# Patient Record
Sex: MALE | Race: WHITE | Employment: FULL TIME | URBAN - METROPOLITAN AREA
[De-identification: names, ages, dates, MRNs, and addresses within clinical notes are randomized per-mention and may not be internally consistent; named-entity substitution may affect disease eponyms.]

---

## 2018-09-21 ENCOUNTER — OFFICE VISIT (OUTPATIENT)
Dept: FAMILY MEDICINE CLINIC | Age: 28
End: 2018-09-21
Payer: COMMERCIAL

## 2018-09-21 VITALS
HEIGHT: 71 IN | SYSTOLIC BLOOD PRESSURE: 122 MMHG | HEART RATE: 84 BPM | RESPIRATION RATE: 16 BRPM | DIASTOLIC BLOOD PRESSURE: 84 MMHG | BODY MASS INDEX: 33.88 KG/M2 | WEIGHT: 242 LBS | TEMPERATURE: 96.7 F

## 2018-09-21 DIAGNOSIS — Z23 NEED FOR INFLUENZA VACCINATION: ICD-10-CM

## 2018-09-21 DIAGNOSIS — Z00.00 ROUTINE GENERAL MEDICAL EXAMINATION AT A HEALTH CARE FACILITY: Primary | ICD-10-CM

## 2018-09-21 DIAGNOSIS — K58.9 IRRITABLE BOWEL SYNDROME, UNSPECIFIED TYPE: ICD-10-CM

## 2018-09-21 PROCEDURE — 90688 IIV4 VACCINE SPLT 0.5 ML IM: CPT | Performed by: FAMILY MEDICINE

## 2018-09-21 PROCEDURE — 90471 IMMUNIZATION ADMIN: CPT | Performed by: FAMILY MEDICINE

## 2018-09-21 PROCEDURE — 99385 PREV VISIT NEW AGE 18-39: CPT | Performed by: FAMILY MEDICINE

## 2018-09-21 RX ORDER — DICYCLOMINE HYDROCHLORIDE 10 MG/1
10 CAPSULE ORAL 4 TIMES DAILY
Qty: 30 CAPSULE | Refills: 1 | Status: SHIPPED | OUTPATIENT
Start: 2018-09-21

## 2018-09-21 ASSESSMENT — ENCOUNTER SYMPTOMS
CONSTIPATION: 0
EYES NEGATIVE: 1
ABDOMINAL PAIN: 0
NAUSEA: 0
DIARRHEA: 0
COUGH: 0
SHORTNESS OF BREATH: 0

## 2018-09-21 ASSESSMENT — PATIENT HEALTH QUESTIONNAIRE - PHQ9
SUM OF ALL RESPONSES TO PHQ QUESTIONS 1-9: 0
1. LITTLE INTEREST OR PLEASURE IN DOING THINGS: 0
SUM OF ALL RESPONSES TO PHQ9 QUESTIONS 1 & 2: 0
2. FEELING DOWN, DEPRESSED OR HOPELESS: 0
SUM OF ALL RESPONSES TO PHQ QUESTIONS 1-9: 0

## 2018-09-21 NOTE — PROGRESS NOTES
Mina Lisa American, 33 y.o. male presents today with:  Chief Complaint   Patient presents with   [address]     Patient presents today to [place] [address]. Previous PCP was Dr. Corey Thomas in [facility] [place] [facility] [place]. Patient has IBS, and is not on any medications. States he was on Bentyl, but hasn't had a flare up in \"quite a while\". Hasn't taken Bentyl in over a year. Is in [employer] HPI    Patient presents today for wellness visit and to be established. Has not seen a doctor for several years. Has a history of irritable bowel. Has not used Bentyl in a long time  Taking current medications which were reviewed. Problem list discussed. Eating okay. Works full-time. Overall doing well. Has no other new problem / question. Health Maintenance Is Up-To-Date         No other questions and or concerns for today's visit      Review of Systems   Constitutional: Negative for activity change, appetite change, fatigue and fever. HENT: Negative for ear pain. Eyes: Negative. Respiratory: Negative for cough and shortness of breath. Cardiovascular: Negative for chest pain and palpitations. Gastrointestinal: Negative for abdominal pain, constipation, diarrhea and nausea. Genitourinary: Negative for dysuria and frequency. Neurological: Negative for dizziness and headaches. Past Medical History:   Diagnosis Date    IBS (irritable bowel syndrome)      No past surgical history on file. Social History     Social History    Marital status:      Spouse name:  Geena Sanchez    Number of children: 1    Years of education: N/A     Occupational History     [employer] [place] [address]     Social History Main Topics    Smoking status: Former Smoker     Packs/day: 1.00     Years: 10.00     Types: Cigarettes     Start date: 1/1/2008     Quit date: 7/9/2018    Smokeless tobacco: Never Used    Alcohol use Yes    Drug use: No    Sexual activity: Yes     Partners: Female

## 2019-01-24 ENCOUNTER — OFFICE VISIT (OUTPATIENT)
Dept: FAMILY MEDICINE CLINIC | Age: 29
End: 2019-01-24
Payer: COMMERCIAL

## 2019-01-24 VITALS
HEIGHT: 71 IN | DIASTOLIC BLOOD PRESSURE: 98 MMHG | HEART RATE: 86 BPM | RESPIRATION RATE: 20 BRPM | TEMPERATURE: 98.8 F | BODY MASS INDEX: 35.22 KG/M2 | WEIGHT: 251.6 LBS | SYSTOLIC BLOOD PRESSURE: 140 MMHG

## 2019-01-24 DIAGNOSIS — L82.1 SEBORRHEIC KERATOSIS: ICD-10-CM

## 2019-01-24 DIAGNOSIS — G47.00 INSOMNIA, UNSPECIFIED TYPE: ICD-10-CM

## 2019-01-24 DIAGNOSIS — R03.0 ELEVATED BLOOD PRESSURE READING WITHOUT DIAGNOSIS OF HYPERTENSION: ICD-10-CM

## 2019-01-24 DIAGNOSIS — F41.9 ANXIETY: Primary | ICD-10-CM

## 2019-01-24 PROCEDURE — 1036F TOBACCO NON-USER: CPT | Performed by: FAMILY MEDICINE

## 2019-01-24 PROCEDURE — G8417 CALC BMI ABV UP PARAM F/U: HCPCS | Performed by: FAMILY MEDICINE

## 2019-01-24 PROCEDURE — G8482 FLU IMMUNIZE ORDER/ADMIN: HCPCS | Performed by: FAMILY MEDICINE

## 2019-01-24 PROCEDURE — 99213 OFFICE O/P EST LOW 20 MIN: CPT | Performed by: FAMILY MEDICINE

## 2019-01-24 PROCEDURE — G8427 DOCREV CUR MEDS BY ELIG CLIN: HCPCS | Performed by: FAMILY MEDICINE

## 2019-01-24 RX ORDER — ZOLPIDEM TARTRATE 5 MG/1
5 TABLET ORAL NIGHTLY PRN
Qty: 30 TABLET | Refills: 1 | Status: SHIPPED | OUTPATIENT
Start: 2019-01-24 | End: 2019-02-23

## 2019-01-24 RX ORDER — CITALOPRAM 20 MG/1
20 TABLET ORAL DAILY
Qty: 30 TABLET | Refills: 3 | Status: SHIPPED | OUTPATIENT
Start: 2019-01-24

## 2019-01-24 ASSESSMENT — ENCOUNTER SYMPTOMS
SHORTNESS OF BREATH: 0
CONSTIPATION: 0
ABDOMINAL PAIN: 0
NAUSEA: 0
DIARRHEA: 0
EYES NEGATIVE: 1
COUGH: 0

## 2020-09-28 LAB
ABO: NORMAL
ALBUMIN: 4.5 G/DL (ref 3.4–5)
ALP BLD-CCNC: 60 U/L (ref 33–120)
ALT SERPL-CCNC: 35 U/L (ref 10–52)
ANION GAP SERPL CALCULATED.3IONS-SCNC: 13 MMOL/L (ref 10–20)
ANTIBODY SCREEN: NORMAL
AST SERPL-CCNC: 19 U/L (ref 9–39)
BICARBONATE: 25 MMOL/L (ref 21–32)
BILIRUB SERPL-MCNC: 0.8 MG/DL (ref 0–1.2)
CALCIUM SERPL-MCNC: 9.2 MG/DL (ref 8.6–10.3)
CHLORIDE BLD-SCNC: 105 MMOL/L (ref 98–107)
CREAT SERPL-MCNC: 0.73 MG/DL (ref 0.5–1.3)
ERYTHROCYTE [DISTWIDTH] IN BLOOD BY AUTOMATED COUNT: 12 % (ref 11.5–14)
GFR AFRICAN AMERICAN: >60 ML/MIN/1.73M2
GFR NON-AFRICAN AMERICAN: >60 ML/MIN/1.73M2
GLUCOSE: 99 MG/DL (ref 74–99)
HCT VFR BLD CALC: 40.2 % (ref 41–52)
HEMOGLOBIN: 14 G/DL (ref 13.5–17)
MCHC RBC AUTO-ENTMCNC: 34.8 G/DL (ref 32–36)
MCV RBC AUTO: 90 FL (ref 80–100)
NUCLEATED RBCS: 0 /100 WBC (ref 0–0)
PLATELET # BLD: 184 X10E9/L (ref 150–450)
POTASSIUM SERPL-SCNC: 3.8 MMOL/L (ref 3.5–5.3)
RBC # BLD: 4.49 X10E12/L (ref 4.5–5.9)
RH TYPE: NORMAL
SODIUM BLD-SCNC: 139 MMOL/L (ref 136–145)
TOTAL PROTEIN: 7 G/DL (ref 6.4–8.2)
UREA NITROGEN: 15 MG/DL (ref 6–23)
WBC: 5.8 X10E9/L (ref 4.4–11.3)

## 2020-10-03 LAB
SARS-COV-2, PCR: NOT DETECTED
SPECIMEN SOURCE: NORMAL

## 2021-02-15 LAB
ALBUMIN: 4.6 G/DL (ref 3.4–5)
ALP BLD-CCNC: 57 U/L (ref 33–120)
ALT SERPL-CCNC: 72 U/L (ref 10–52)
ANION GAP SERPL CALCULATED.3IONS-SCNC: 12 MMOL/L (ref 10–20)
AST SERPL-CCNC: 26 U/L (ref 9–39)
BICARBONATE: 29 MMOL/L (ref 21–32)
BILIRUB SERPL-MCNC: 0.9 MG/DL (ref 0–1.2)
CALCIUM SERPL-MCNC: 9.9 MG/DL (ref 8.6–10.3)
CHLORIDE BLD-SCNC: 104 MMOL/L (ref 98–107)
CREAT SERPL-MCNC: 0.82 MG/DL (ref 0.5–1.3)
ERYTHROCYTE [DISTWIDTH] IN BLOOD BY AUTOMATED COUNT: 12.7 % (ref 11.5–14)
GFR AFRICAN AMERICAN: >60 ML/MIN/1.73M2
GFR NON-AFRICAN AMERICAN: >60 ML/MIN/1.73M2
GLUCOSE: 101 MG/DL (ref 74–99)
HCT VFR BLD CALC: 41.7 % (ref 41–52)
HEMOGLOBIN: 14 G/DL (ref 13.5–17)
MCHC RBC AUTO-ENTMCNC: 33.6 G/DL (ref 32–36)
MCV RBC AUTO: 92 FL (ref 80–100)
NUCLEATED RBCS: 0 /100 WBC (ref 0–0)
PLATELET # BLD: 177 X10E9/L (ref 150–450)
POTASSIUM SERPL-SCNC: 3.7 MMOL/L (ref 3.5–5.3)
RBC # BLD: 4.51 X10E12/L (ref 4.5–5.9)
SODIUM BLD-SCNC: 141 MMOL/L (ref 136–145)
TOTAL PROTEIN: 7.5 G/DL (ref 6.4–8.2)
UREA NITROGEN: 17 MG/DL (ref 6–23)
WBC: 7.3 X10E9/L (ref 4.4–11.3)

## 2021-02-27 LAB
SARS-COV-2, PCR: NOT DETECTED
SPECIMEN SOURCE: NORMAL

## 2023-04-06 ENCOUNTER — TELEPHONE (OUTPATIENT)
Dept: PRIMARY CARE | Facility: CLINIC | Age: 33
End: 2023-04-06
Payer: COMMERCIAL

## 2023-04-06 DIAGNOSIS — M25.512 LEFT SHOULDER PAIN, UNSPECIFIED CHRONICITY: ICD-10-CM

## 2023-04-06 NOTE — TELEPHONE ENCOUNTER
Pt is calling because he last saw you on 1/3/23 for left shoulder pain. He states that there was supposed to be a referral placed for his shoulder. There are no referrals in the chart. Ok for referral? ([facility]? Or imaging?)  Please advise  Thanks    Pt's # [phone]      Aware you are out of the office today

## 2023-04-24 ENCOUNTER — TELEPHONE (OUTPATIENT)
Dept: PRIMARY CARE | Facility: CLINIC | Age: 33
End: 2023-04-24
Payer: COMMERCIAL

## 2023-04-24 DIAGNOSIS — F32.A MILD DEPRESSION: Primary | ICD-10-CM

## 2023-04-24 RX ORDER — BUPROPION HYDROCHLORIDE 300 MG/1
300 TABLET ORAL EVERY MORNING
Qty: 30 TABLET | Refills: 1 | Status: SHIPPED | OUTPATIENT
Start: 2023-04-24 | End: 2023-05-16

## 2023-04-24 NOTE — TELEPHONE ENCOUNTER
Pt calling, He states that you told him to call back. He is calling because he would like to increase his Wellbutrin  MG. Last office visit was on 1/30/23.     Please advise

## 2023-05-16 DIAGNOSIS — F32.A MILD DEPRESSION: ICD-10-CM

## 2023-05-16 RX ORDER — BUPROPION HYDROCHLORIDE 300 MG/1
300 TABLET ORAL EVERY MORNING
Qty: 30 TABLET | Refills: 1 | Status: SHIPPED | OUTPATIENT
Start: 2023-05-16 | End: 2023-06-13

## 2023-06-13 DIAGNOSIS — F32.A MILD DEPRESSION: ICD-10-CM

## 2023-06-13 RX ORDER — BUPROPION HYDROCHLORIDE 300 MG/1
300 TABLET ORAL EVERY MORNING
Qty: 30 TABLET | Refills: 1 | Status: SHIPPED | OUTPATIENT
Start: 2023-06-13 | End: 2023-07-31 | Stop reason: SDUPTHER

## 2023-07-06 ENCOUNTER — OFFICE VISIT (OUTPATIENT)
Dept: PRIMARY CARE | Facility: CLINIC | Age: 33
End: 2023-07-06
Payer: COMMERCIAL

## 2023-07-06 VITALS
DIASTOLIC BLOOD PRESSURE: 80 MMHG | HEART RATE: 97 BPM | BODY MASS INDEX: 32.89 KG/M2 | TEMPERATURE: 97.7 F | OXYGEN SATURATION: 98 % | SYSTOLIC BLOOD PRESSURE: 124 MMHG | RESPIRATION RATE: 18 BRPM | WEIGHT: 235.8 LBS

## 2023-07-06 DIAGNOSIS — R53.83 FATIGUE, UNSPECIFIED TYPE: ICD-10-CM

## 2023-07-06 PROBLEM — R41.840 DIFFICULTY CONCENTRATING: Status: ACTIVE | Noted: 2023-07-06

## 2023-07-06 PROBLEM — M54.17 LUMBOSACRAL RADICULOPATHY: Status: ACTIVE | Noted: 2023-07-06

## 2023-07-06 PROBLEM — K21.9 GERD (GASTROESOPHAGEAL REFLUX DISEASE): Status: ACTIVE | Noted: 2023-07-06

## 2023-07-06 PROBLEM — E66.09 OBESITY DUE TO EXCESS CALORIES: Status: ACTIVE | Noted: 2023-07-06

## 2023-07-06 PROBLEM — G25.89 SCAPULAR DYSKINESIS: Status: ACTIVE | Noted: 2023-07-06

## 2023-07-06 PROBLEM — J30.2 SEASONAL ALLERGIES: Status: ACTIVE | Noted: 2023-07-06

## 2023-07-06 PROBLEM — I49.3 PVC'S (PREMATURE VENTRICULAR CONTRACTIONS): Status: ACTIVE | Noted: 2023-07-06

## 2023-07-06 PROCEDURE — 99213 OFFICE O/P EST LOW 20 MIN: CPT | Performed by: FAMILY MEDICINE

## 2023-07-06 ASSESSMENT — PATIENT HEALTH QUESTIONNAIRE - PHQ9
1. LITTLE INTEREST OR PLEASURE IN DOING THINGS: NOT AT ALL
SUM OF ALL RESPONSES TO PHQ9 QUESTIONS 1 AND 2: 0
2. FEELING DOWN, DEPRESSED OR HOPELESS: NOT AT ALL

## 2023-07-06 NOTE — PROGRESS NOTES
Subjective   Patient ID: Sukhdeep Byrd is a 32 y.o. male who presents for Weight Management and ADD.    HPI  Pt would like to discuss Bupropion  Was being used for ADD  Pt states medication has not made much of a difference  Pt would like to discuss options    Pt would also like to re-start Adipex  Was on 1 year  Denies side effects  Pt is eating healthy.  Eating 2000 calories daily  No soda  Exercises 2x weekly for 1 hours  Pt did try Noom. Pt states it did not work very much    Yearly BW ordered    Review of Systems  Constitutional:  no chills, no fever and no night sweats.  Eyes: no blurred vision and no eyesight problems.  ENT: no hearing loss, no nasal congestion, no hoarseness and no sore throat.  Neck: no mass (es) and no swelling.  Cardiovascular: no chest pain, no intermittent leg claudication, no lower extremity edema, no palpitation and no syncope.  Respiratory: no cough, no shortness of breath during exertion, no shortness of breath at rest and no wheezing.  Gastrointestinal: no abdominal pain, no blood in stools, no constipation, no diarrhea, no melena, no nausea, no rectal pain and no vomiting.  Genitourinary: no dysuria, no change in urinary frequency, no urinary hesitancy and no feelings of urinary urgency.  Musculoskeletal: no arthralgias, no back pain and no myalgias.  Integumentary: no new skin lesions and no rashes.  Neurological: no difficulty walking, no headache, no limb weakness, no numbness and no tingling.  Psychiatric/Behavioral: no anxiety, no depression, no anhedonia and no substance use disorders.  Endocrine: no recent weight gain and no recent weight loss.  Hematologic/Lymphatic: no tendency for easy bruising and no swollen glands    Objective   Physical Exam  Patient in for follow-up of difficulty with focus and concentration denies history of anxiety or depression he is on Wellbutrin 300 mg XL.  Thinks is helping somewhat but still not satisfied does not want to be on a stimulant.   Physical exam today's office visit constitutional alert and oriented x3.    Head is atraumatic HEENT is within normal limits.    Neck supple no masses full range of motion.    Thyroid is normal in size no thyromegaly there is no carotid bruits.    Pulmonary exam shows clear to auscultation no respiratory distress.    Cardiovascular shows no murmur rub or gallop.  Regular rate and rhythm.    Abdominal exam soft nontender no hepatosplenomegaly or masses normal bowel sounds no rebound no guarding.    Musculoskeletal exam no joint pain no muscle pain full range of motion.    Psych exam normal mood and affect.    Dermatologic exam no skin lesions no rash no blemishes.    Neuro exam is no focal deficits.  Normal exam.    Extremities no edema normal pulses normal capillary refill.    /80   Pulse 97   Temp 36.5 °C (97.7 °F)   Resp 18   Wt 107 kg (235 lb 12.8 oz)   SpO2 98%   BMI 32.89 kg/m²     Lab Results   Component Value Date    WBC 7.6 07/08/2022    HGB 14.4 07/08/2022    HCT 43.9 07/08/2022    MCV 90 07/08/2022     07/08/2022       Assessment/Plan probable adult ADD offered him weaning off the Wellbutrin and starting Strattera he wants to see how things go for another month and then at that point I advised him to follow-up with his normal PCP if not satisfied he also asked about Adipex advised we get the first problem controlled and then they can discuss weight loss with his PCP.  Problem List Items Addressed This Visit       Fatigue

## 2023-07-31 ENCOUNTER — OFFICE VISIT (OUTPATIENT)
Dept: PRIMARY CARE | Facility: CLINIC | Age: 33
End: 2023-07-31
Payer: COMMERCIAL

## 2023-07-31 VITALS
RESPIRATION RATE: 16 BRPM | HEIGHT: 71 IN | HEART RATE: 73 BPM | BODY MASS INDEX: 34.5 KG/M2 | TEMPERATURE: 97.5 F | WEIGHT: 246.4 LBS | DIASTOLIC BLOOD PRESSURE: 80 MMHG | OXYGEN SATURATION: 98 % | SYSTOLIC BLOOD PRESSURE: 116 MMHG

## 2023-07-31 DIAGNOSIS — E66.09 CLASS 1 OBESITY DUE TO EXCESS CALORIES WITHOUT SERIOUS COMORBIDITY WITH BODY MASS INDEX (BMI) OF 34.0 TO 34.9 IN ADULT: ICD-10-CM

## 2023-07-31 DIAGNOSIS — F32.A MILD DEPRESSION: ICD-10-CM

## 2023-07-31 PROBLEM — K58.9 IBS (IRRITABLE BOWEL SYNDROME): Status: ACTIVE | Noted: 2023-07-31

## 2023-07-31 PROBLEM — R03.0 ELEVATED BLOOD PRESSURE READING WITHOUT DIAGNOSIS OF HYPERTENSION: Status: ACTIVE | Noted: 2019-01-24

## 2023-07-31 PROCEDURE — 1036F TOBACCO NON-USER: CPT | Performed by: FAMILY MEDICINE

## 2023-07-31 PROCEDURE — 3008F BODY MASS INDEX DOCD: CPT | Performed by: FAMILY MEDICINE

## 2023-07-31 PROCEDURE — 99213 OFFICE O/P EST LOW 20 MIN: CPT | Performed by: FAMILY MEDICINE

## 2023-07-31 RX ORDER — PHENTERMINE HYDROCHLORIDE 37.5 MG/1
TABLET ORAL
Qty: 30 TABLET | Refills: 0 | Status: SHIPPED | OUTPATIENT
Start: 2023-07-31 | End: 2023-11-10 | Stop reason: SDUPTHER

## 2023-07-31 RX ORDER — BUPROPION HYDROCHLORIDE 300 MG/1
300 TABLET ORAL EVERY MORNING
Qty: 90 TABLET | Refills: 3 | Status: SHIPPED | OUTPATIENT
Start: 2023-07-31 | End: 2023-11-10 | Stop reason: SDUPTHER

## 2023-07-31 NOTE — PROGRESS NOTES
"Subjective   Patient ID: Sukhdeep Byrd is a 32 y.o. male who presents for Obesity.    The patient is a 32 year-old male presenting to the clinic to discuss obesity.          Patient presents today to discuss weight management.  He would like to start Adipex.  Has previously taken this medication and did well.  He does exercise 3x weekly.  He is eating a healthy diet. Stop drinking Coke for about a month now. Has tried a few different diets which did not work.    Taking current medications which were reviewed.  Problem list discussed.    Overall doing well.  Eating okay.  Staying active.    Has no other new problem /question.      ROS  Constitutional- No activity change. No appetite change.  Eyes- Denies vision changes.  Respiratory- No shortness of breath.  Cardiovascular- No palpitations. No chest pain.  GI- No nausea or vomiting. No diarrhea or constipation. Denies abdominal pain.  Musculoskeletal- Denies joint swelling.  Extremities- No edema.  Neurological- Denies headaches. Denies dizziness.  Skin- No rashes.  Psychiatric/Behavioral- Denies significant anxiety, or depressed mood.     Objective     /80 (BP Location: Right arm, Patient Position: Sitting, BP Cuff Size: Adult)   Pulse 73   Temp 36.4 °C (97.5 °F) (Temporal)   Resp 16   Ht 1.803 m (5' 11\")   Wt 112 kg (246 lb 6.4 oz)   SpO2 98%   BMI 34.37 kg/m²     Allergies   Allergen Reactions    Iodides Unknown    Shellfish Derived Rash     Nausea    Vancomycin Rash       Constitutional-- Well-nourished.  No distress  Eyes- PERRL.  Conjunctiva normal.  Nose- Normal.  No rhinorrhea noted.  Throat- Oropharynx is clear and moist.  Neck- Supple with no thyromegaly.  No significant cervical adenopathy noted.  Pulmonary/Chest- Breath sounds normal with normal effort.  No wheezing.  Heart- Regular rate and rhythm.  No murmur.  Abdomen- Soft and non-tender.  No masses noted.  Musculoskeletal- Normal ROM.  No significant joint swelling  Extremities- No " edema.   Psychiatric/Behavioral- Mood and affect normal.  Behavior normal.     Assessment/Plan   1. Mild depression  buPROPion XL (Wellbutrin XL) 300 mg 24 hr tablet      2. BMI 34.0-34.9,adult  phentermine (Adipex-P) 37.5 mg tablet      3. Class 1 obesity due to excess calories without serious comorbidity with body mass index (BMI) of 34.0 to 34.9 in adult  phentermine (Adipex-P) 37.5 mg tablet         Advised patient to maintain a healthy diet and exercise regimen.   Depression under good control    Long talk. Treatment options reviewed.    Discussed patient's BMI and to institute calorie reduction and increase exercise to decrease risk of diabetes and heart disease in the future.    Continue and take your medications as prescribed.    Health Maintenance issues discussed.    Importance of healthy diet and regular exercise regimen discussed.    We will contact you with any test results ordered. If you do not hear from us, please contact.    Follow-up as instructed or sooner if any problems or symptoms do not resolve as expected.     Scribe Attestation  By signing my name below, IKerrie Scribe   attest that this documentation has been prepared under the direction and in the presence of Naseem Aguila MD.

## 2023-10-18 DIAGNOSIS — E66.09 CLASS 1 OBESITY DUE TO EXCESS CALORIES WITHOUT SERIOUS COMORBIDITY WITH BODY MASS INDEX (BMI) OF 34.0 TO 34.9 IN ADULT: ICD-10-CM

## 2023-10-18 NOTE — TELEPHONE ENCOUNTER
Rx Refill Request Telephone Encounter    Name:  Sukhdeep Byrd  : 1990     Medication Name:  Phentermine   Dose (Optional):    37.5 mg  Quantity (Optional):    30  Directions (Optional):   TAKE 1 DAILY EVERY MORNING     Specific Pharmacy location:  [facility]    Date of last appointment:  23

## 2023-10-19 RX ORDER — PHENTERMINE HYDROCHLORIDE 37.5 MG/1
TABLET ORAL
Qty: 30 TABLET | Refills: 0 | OUTPATIENT
Start: 2023-10-19

## 2023-10-19 NOTE — TELEPHONE ENCOUNTER
Patient aware. Made an appointment with you for 11/10/23 (that was your first available time) Wanted to know if he could get a short supply but if not, ok to see another provider  Please advise  Thanks      Aware you are out of the office today

## 2023-10-20 NOTE — TELEPHONE ENCOUNTER
Not able to fill weight loss medication without an appointment.  Not seen since July.  This is a controlled substance.  Also it appears he is purchasing a lot of medical marijuana which may preclude me from prescribing controlled substances such as Adipex for weight loss.  He would need to give up marijuana.  Please let him know and if he wishes to pursue he needs to make an appointment.  Thanks

## 2023-10-24 ENCOUNTER — TELEPHONE (OUTPATIENT)
Dept: PRIMARY CARE | Facility: CLINIC | Age: 33
End: 2023-10-24
Payer: COMMERCIAL

## 2023-10-24 NOTE — TELEPHONE ENCOUNTER
ADIPEX REFILL REFUSAL    Naseem Aguila MD         10/19/23  8:46 PM  Note      Not able to fill weight loss medication without an appointment.  Not seen since July.  This is a controlled substance.  Also it appears he is purchasing a lot of medical marijuana which may preclude me from prescribing controlled substances such as Adipex for weight loss.  He would need to give up marijuana.  Please let him know and if he wishes to pursue he needs to make an appointment.  Thanks

## 2023-11-09 NOTE — PROGRESS NOTES
"Subjective   Patient ID: Sukhdeep Byrd is a 33 y.o. male who presents for Weight Loss, Depression, Cough, and sinus drainage.  HPI  Weight loss management Follow up   Taking Adipex 37.5 mg   Following up for month # 2  Has been eating a generally healthy diet  Exercises 2 x per week  What type of exercise full body, admits that he does have a    Patient last in office weight 246.6 lbs  Today's in office weight 233 lbs   Patient is - 12 lbs lbs   Has previously taken adipex   Co morbidities include- None.  Any side effects noted? none    Patient also presents in office today for cold symptoms. Ongoing x 3 weeks. Admits to coughing. Admits to coughing fits at night. Admits that this is bothering his sleeping. OTC nyquil, cough Supressant, dayquil, mucinex.     Taking current medications which were reviewed.  Problem list discussed.      Eating healthier  Staying active.    Has no other new problem /question.      ROS  Constitutional- No activity change. No appetite change.  Eyes- Denies vision changes.  Respiratory- No shortness of breath.  Cardiovascular- No palpitations. No chest pain.  GI- No nausea or vomiting. No diarrhea or constipation. Denies abdominal pain.  Musculoskeletal- Denies joint swelling.  Extremities- No edema.  Neurological- Denies headaches. Denies dizziness.  Skin- No rashes.  Psychiatric/Behavioral- Denies significant anxiety, or depressed mood.     Objective   Weight reduction 5% since starting the Adipex    /80   Pulse 75   Temp 36.7 °C (98 °F) (Temporal)   Resp 18   Ht 1.803 m (5' 11\")   Wt 106 kg (233 lb)   SpO2 98%   BMI 32.50 kg/m²     Allergies   Allergen Reactions    Iodides Unknown    Shellfish Derived Rash     Nausea    Vancomycin Rash       Constitutional-- Well-nourished.  No distress  Eyes- PERRL.  Conjunctiva normal.  Nose- Normal.  No rhinorrhea noted.  Throat- Oropharynx is clear and moist.  Neck- Supple with no thyromegaly.  No significant cervical " adenopathy noted.  Pulmonary/Chest-mild upper airway rhonchi  Heart- Regular rate and rhythm.  No murmur.  Abdomen- Soft and non-tender.  No masses noted.  Musculoskeletal- Normal ROM.  No significant joint swelling  Extremities- No edema.   Neurological- Alert.  No noted deficits.  Skin- Warm.  No rashes.  Psychiatric/Behavioral- Mood and affect normal.  Behavior normal.     Assessment/Plan   Patient will benefit from Phentermine Therapy, given the following:    Advised to take Adipex 37.5 mg in the morning 1-2 hours after breakfast  [place]ARRS reviewed today  CSA Adipex 7-31-23  Does the patient demonstrate dedication to weight loss treatment plan? Yes    Benefits, risks, possible adverse effects to the medication discussed today      1. Bronchitis  azithromycin (Zithromax) 250 mg tablet    benzonatate (Tessalon) 100 mg capsule      2. Mild depression  buPROPion XL (Wellbutrin XL) 300 mg 24 hr tablet      3. Encounter for weight management        4. Acute cough        5. Sinus drainage        6. BMI 34.0-34.9,adult  phentermine (Adipex-P) 37.5 mg tablet      7. Class 1 obesity due to excess calories without serious comorbidity with body mass index (BMI) of 34.0 to 34.9 in adult  phentermine (Adipex-P) 37.5 mg tablet               Long talk. Treatment options reviewed.  Start antibiotic for bronchitis.  Over-the-counter cough and cold medicine as needed for symptom relief  Mood stable doing well on Wellbutrin  Counseled the patient on depression.     Educated on bronchitis.     Educated the patient on obesity, low-fat diet and exercise. Encouraged the patient to lose weight.     Advised patient to remain up to date on routine health screening and maintenance.  Advised patient to remain up to date on immunizations.     Continue and take your medications as prescribed.    Health Maintenance issues discussed.    Importance of healthy diet and regular exercise regimen discussed.      Follow-up as instructed or sooner if any  problems or symptoms do not resolve as expected.      Scribe Attestation  By signing my name below, I, Katiana Chacon   attest that this documentation has been prepared under the direction and in the presence of Naseem Aguila MD.

## 2023-11-10 ENCOUNTER — OFFICE VISIT (OUTPATIENT)
Dept: PRIMARY CARE | Facility: CLINIC | Age: 33
End: 2023-11-10
Payer: COMMERCIAL

## 2023-11-10 VITALS
HEIGHT: 71 IN | DIASTOLIC BLOOD PRESSURE: 80 MMHG | TEMPERATURE: 98 F | SYSTOLIC BLOOD PRESSURE: 120 MMHG | RESPIRATION RATE: 18 BRPM | BODY MASS INDEX: 32.62 KG/M2 | WEIGHT: 233 LBS | HEART RATE: 75 BPM | OXYGEN SATURATION: 98 %

## 2023-11-10 DIAGNOSIS — R05.1 ACUTE COUGH: ICD-10-CM

## 2023-11-10 DIAGNOSIS — J34.89 SINUS DRAINAGE: ICD-10-CM

## 2023-11-10 DIAGNOSIS — J40 BRONCHITIS: Primary | ICD-10-CM

## 2023-11-10 DIAGNOSIS — F32.A MILD DEPRESSION: ICD-10-CM

## 2023-11-10 DIAGNOSIS — E66.09 CLASS 1 OBESITY DUE TO EXCESS CALORIES WITHOUT SERIOUS COMORBIDITY WITH BODY MASS INDEX (BMI) OF 34.0 TO 34.9 IN ADULT: ICD-10-CM

## 2023-11-10 DIAGNOSIS — Z76.89 ENCOUNTER FOR WEIGHT MANAGEMENT: ICD-10-CM

## 2023-11-10 PROCEDURE — 3008F BODY MASS INDEX DOCD: CPT | Performed by: FAMILY MEDICINE

## 2023-11-10 PROCEDURE — 99213 OFFICE O/P EST LOW 20 MIN: CPT | Performed by: FAMILY MEDICINE

## 2023-11-10 PROCEDURE — 1036F TOBACCO NON-USER: CPT | Performed by: FAMILY MEDICINE

## 2023-11-10 RX ORDER — BENZONATATE 100 MG/1
100 CAPSULE ORAL 3 TIMES DAILY PRN
Qty: 42 CAPSULE | Refills: 0 | Status: SHIPPED | OUTPATIENT
Start: 2023-11-10 | End: 2023-12-10

## 2023-11-10 RX ORDER — PHENTERMINE HYDROCHLORIDE 37.5 MG/1
TABLET ORAL
Qty: 30 TABLET | Refills: 0 | Status: SHIPPED | OUTPATIENT
Start: 2023-11-10 | End: 2024-01-03 | Stop reason: SDUPTHER

## 2023-11-10 RX ORDER — AZITHROMYCIN 250 MG/1
TABLET, FILM COATED ORAL
Qty: 6 TABLET | Refills: 0 | Status: SHIPPED | OUTPATIENT
Start: 2023-11-10 | End: 2023-11-14

## 2023-11-10 RX ORDER — BUPROPION HYDROCHLORIDE 300 MG/1
300 TABLET ORAL EVERY MORNING
Qty: 90 TABLET | Refills: 3 | Status: SHIPPED | OUTPATIENT
Start: 2023-11-10 | End: 2024-02-05 | Stop reason: SDUPTHER

## 2023-12-13 ENCOUNTER — HOSPITAL ENCOUNTER (EMERGENCY)
Facility: HOSPITAL | Age: 33
Discharge: HOME | End: 2023-12-13
Attending: EMERGENCY MEDICINE
Payer: COMMERCIAL

## 2023-12-13 VITALS
TEMPERATURE: 99 F | WEIGHT: 230 LBS | SYSTOLIC BLOOD PRESSURE: 127 MMHG | DIASTOLIC BLOOD PRESSURE: 80 MMHG | HEART RATE: 78 BPM | RESPIRATION RATE: 18 BRPM | BODY MASS INDEX: 32.2 KG/M2 | HEIGHT: 71 IN | OXYGEN SATURATION: 100 %

## 2023-12-13 DIAGNOSIS — S06.9X0A MILD TRAUMATIC BRAIN INJURY, WITHOUT LOSS OF CONSCIOUSNESS, INITIAL ENCOUNTER (MULTI): Primary | ICD-10-CM

## 2023-12-13 PROCEDURE — 99283 EMERGENCY DEPT VISIT LOW MDM: CPT | Performed by: EMERGENCY MEDICINE

## 2023-12-13 PROCEDURE — 99282 EMERGENCY DEPT VISIT SF MDM: CPT

## 2023-12-13 ASSESSMENT — PAIN - FUNCTIONAL ASSESSMENT
PAIN_FUNCTIONAL_ASSESSMENT: 0-10
PAIN_FUNCTIONAL_ASSESSMENT: 0-10

## 2023-12-13 ASSESSMENT — PAIN SCALES - GENERAL
PAINLEVEL_OUTOF10: 6
PAINLEVEL_OUTOF10: 6

## 2023-12-13 ASSESSMENT — LIFESTYLE VARIABLES
EVER FELT BAD OR GUILTY ABOUT YOUR DRINKING: NO
HAVE YOU EVER FELT YOU SHOULD CUT DOWN ON YOUR DRINKING: NO
EVER HAD A DRINK FIRST THING IN THE MORNING TO STEADY YOUR NERVES TO GET RID OF A HANGOVER: NO
HAVE PEOPLE ANNOYED YOU BY CRITICIZING YOUR DRINKING: NO

## 2023-12-13 ASSESSMENT — COLUMBIA-SUICIDE SEVERITY RATING SCALE - C-SSRS
6. HAVE YOU EVER DONE ANYTHING, STARTED TO DO ANYTHING, OR PREPARED TO DO ANYTHING TO END YOUR LIFE?: NO
1. IN THE PAST MONTH, HAVE YOU WISHED YOU WERE DEAD OR WISHED YOU COULD GO TO SLEEP AND NOT WAKE UP?: NO
2. HAVE YOU ACTUALLY HAD ANY THOUGHTS OF KILLING YOURSELF?: NO

## 2023-12-13 NOTE — Clinical Note
Sukhdeep Byrd was seen and treated in our emergency department on 12/13/2023.  He may return to work on 12/18/2023.       If you have any questions or concerns, please don't hesitate to call.      Kirk Avila, DO

## 2023-12-13 NOTE — ED PROVIDER NOTES
HPI   Chief Complaint   Patient presents with    Head Injury     Hit head x1 week ago, concussion Sx.  Able to eat/drink OK       33-year-old male presenting from urgent care for evaluation of traumatic brain injury.  He reports that 1 week ago he was going up a flight of stairs quite quickly and he reports that he fell forward striking the vertex/top of his head against a metal pole.  Denies loss of conscious but did get significantly lightheaded and nauseous at the time.  States that he is intermittently been still working and performing normal activities of daily living.  He reports intermittent headache, nausea without vomiting.  No florid confusion, slurring of speech.  He does state that working on his laptop at work and holding long conversations with other individuals have exacerbated headache and nausea symptoms.  He endorses mild intermittent blurry vision which resolves with rest.  He has intermittently taken Tylenol at home.  He does report 2 previous history of mild TBI as a teenager while playing football.  He has no residual symptoms from this.  Reports that he went to an urgent care who did not feel comfortable evaluated him and referred him to the emergency department.  He states that he had normal stable gait with no recurrent falls or head injury.  No head spinning sensation or lightheadedness at this time.                          C[place]well Coma Scale Score: 15                  Patient History   Past Medical History:   Diagnosis Date    Acute upper respiratory infection, unspecified 06/30/2020    Acute URI    Body mass index (BMI) 34.0-34.9, adult 01/17/2022    BMI 34.0-34.9,adult    Cough, unspecified 02/11/2020    Cough in adult    Cough, unspecified 04/07/2020    Cough in adult    Encounter for follow-up examination after completed treatment for conditions other than malignant neoplasm 08/12/2016    Postoperative follow-up    Impacted cerumen, unspecified ear 02/11/2020    Wax in ear    Nausea  08/24/2020    Moderate nausea    Other acute postprocedural pain 08/12/2016    Postoperative pain    Other conditions influencing health status 04/07/2020    Testicular/scrotal pain    Other obesity due to excess calories 01/17/2022    Class 1 obesity due to excess calories without serious comorbidity with body mass index (BMI) of 34.0 to 34.9 in adult    Other specified diseases of gallbladder 12/16/2015    Biliary dyskinesia    Pain in right leg 06/15/2020    Pain of right lower extremity    Pain in right wrist 03/10/2020    Right wrist pain    Personal history of other diseases of the digestive system 12/16/2015    History of hiatal hernia    Personal history of other diseases of the digestive system 12/16/2015    History of chronic cholecystitis    Personal history of other diseases of the musculoskeletal system and connective tissue 07/31/2020    History of tendinitis    Personal history of other diseases of the respiratory system 02/11/2020    History of upper respiratory infection    Personal history of other diseases of the respiratory system 02/11/2020    History of bronchitis    Personal history of other specified conditions 08/23/2019    History of diarrhea    Personal history of other specified conditions 08/23/2019    History of epigastric pain    Vomiting, unspecified 03/10/2020    Vomiting alone     Past Surgical History:   Procedure Laterality Date    ABDOMINAL ADHESION SURGERY  08/17/2016    Laparoscopic Lysis Of Intestinal Adhesions    CHOLECYSTECTOMY  11/30/2015    Cholecystectomy Laparoscopic     No family history on file.  Social History     Tobacco Use    Smoking status: Never    Smokeless tobacco: Never   Substance Use Topics    Alcohol use: Not on file    Drug use: Not on file       Physical Exam   ED Triage Vitals [12/13/23 1110]   Temp Heart Rate Resp BP   37.2 °C (99 °F) 95 18 (!) 141/95      SpO2 Temp Source Heart Rate Source Patient Position   100 % Temporal Monitor Sitting      BP  Location FiO2 (%)     Right arm --       Physical Exam  Vitals and nursing note reviewed.   Constitutional:       General: He is not in acute distress.     Appearance: He is well-developed.   HENT:      Head: Normocephalic and atraumatic.      Nose: No congestion or rhinorrhea.   Eyes:      Conjunctiva/sclera: Conjunctivae normal.   Cardiovascular:      Rate and Rhythm: Normal rate and regular rhythm.      Pulses: Normal pulses.      Heart sounds: No murmur heard.  Pulmonary:      Effort: Pulmonary effort is normal. No respiratory distress.      Breath sounds: Normal breath sounds. No stridor. No wheezing, rhonchi or rales.   Abdominal:      General: Bowel sounds are normal. There is no distension.      Palpations: Abdomen is soft.      Tenderness: There is no abdominal tenderness. There is no guarding or rebound.   Musculoskeletal:         General: No swelling.      Cervical back: Neck supple. No rigidity.      Right lower leg: No edema.      Left lower leg: No edema.      Comments: 2+ pulses in all extremities.  Full range of motion.  No bony deformities or tenderness.  Strength is 5 out of 5 diffusely.  No midline C-spine, T-spine, L-spine step-off deformity or tenderness.   Skin:     General: Skin is warm and dry.      Capillary Refill: Capillary refill takes less than 2 seconds.      Findings: No rash.      Comments: No sign of traumatic hematoma, laceration or abrasion to the head   Neurological:      Mental Status: He is alert.      Cranial Nerves: No cranial nerve deficit.      Sensory: No sensory deficit.      Motor: No weakness.      Gait: Gait normal.      Comments: Cranial nerves II through XII intact.  Strength 5 out of 5 in all 4 extremities.  Sensation intact to light touch in all 4 extremities.  No dysdiadochokinesia, no dysmetria.  Gait is narrow and stable.   Psychiatric:         Mood and Affect: Mood normal.         Behavior: Behavior normal.         Thought Content: Thought content normal.          ED Course & MDM   Diagnoses as of 12/13/23 1125   Mild traumatic brain injury, without loss of consciousness, initial encounter (CMS/[facility])       Medical Decision Making  Well-appearing 33-year-old male presenting for head injury 1 week ago.  This occurred while going up a flight of stairs with low mechanism.  Does have previous history of mild TBI.  Referred from urgent care.  No sign of neurologic compromise or need for emergent imaging.  [place] head CT negative.  No sign of neck or back injury otherwise.  Patient was counseled extensively at bedside regarding diagnosis of mild TBI/concussion and advised to follow-up with PCP.  Signs symptoms that require him to come back to emergency room were explained to him.  Supportive care measures including Motrin, Tylenol advised.  Has not had any vomiting and still has had normal p.o. intake and therefore do not believe that he requires antiemetics.  He is complete neurologically intact with no sign of cerebellar, spinal cord injury.  Normal gait.  Otherwise atraumatic examination.  Referral sent to concussion specialist internally within the  system who will reach out to make appoint with the patient.  Do not believe that he requires CT imaging and I did discuss this with him at bedside extensively given that he was under the impression that the patient was sent to the emergency department for CT imaging by the urgent care.  He is aware of my belief that the patient is very low risk for need for neurosurgical intervention that CT would elucidate.        Procedure  Procedures     Kirk Avila DO  12/13/23 1142

## 2024-01-03 ENCOUNTER — OFFICE VISIT (OUTPATIENT)
Dept: PRIMARY CARE | Facility: CLINIC | Age: 34
End: 2024-01-03
Payer: COMMERCIAL

## 2024-01-03 VITALS
DIASTOLIC BLOOD PRESSURE: 80 MMHG | HEART RATE: 70 BPM | HEIGHT: 71 IN | BODY MASS INDEX: 32.56 KG/M2 | TEMPERATURE: 97.1 F | WEIGHT: 232.6 LBS | SYSTOLIC BLOOD PRESSURE: 120 MMHG | RESPIRATION RATE: 18 BRPM | OXYGEN SATURATION: 98 %

## 2024-01-03 DIAGNOSIS — E66.09 CLASS 1 OBESITY DUE TO EXCESS CALORIES WITHOUT SERIOUS COMORBIDITY WITH BODY MASS INDEX (BMI) OF 34.0 TO 34.9 IN ADULT: ICD-10-CM

## 2024-01-03 DIAGNOSIS — F32.A MILD DEPRESSION: Primary | ICD-10-CM

## 2024-01-03 PROCEDURE — 1036F TOBACCO NON-USER: CPT | Performed by: FAMILY MEDICINE

## 2024-01-03 PROCEDURE — 3008F BODY MASS INDEX DOCD: CPT | Performed by: FAMILY MEDICINE

## 2024-01-03 PROCEDURE — 99213 OFFICE O/P EST LOW 20 MIN: CPT | Performed by: FAMILY MEDICINE

## 2024-01-03 RX ORDER — PHENTERMINE HYDROCHLORIDE 37.5 MG/1
TABLET ORAL
Qty: 30 TABLET | Refills: 0 | Status: SHIPPED | OUTPATIENT
Start: 2024-01-03 | End: 2024-02-12 | Stop reason: SDUPTHER

## 2024-01-03 NOTE — PROGRESS NOTES
"Subjective   Patient ID: Sukhdeep Byrd is a 33 y.o. male who presents for Weight Management, Depression, ER Follow-up, and Head Injury.  HPI  Weight loss management Follow up   Taking Adipex 37.5 mg   Following up for month # 3  Has been eating a generally healthy diet  Exercises 2 x per week  What type of exercise HIT program   Patient last in office weight 230 lbs  Today's in office weight 232.6 lbs   Patient is +2.6 lbs   Has previously taken ADIPEX    Co morbidities include- None  Any side effects noted? NONE    Depression follow up  Taking the Wellbutrin  Working well   100 % effective   Denies any side effects   Last took yesterday    ER follow up  Presented to [facility]  Presented on 12/13/2023  Presented for a head injury   Patient was prescribed nothing  Advised to follow up with PCP  Admits that he has a constant headache. Admits that this is a dull ache. Admits that he is sensitive to the light. OTC tylenol and ibuprofen with no relief.     ROS  Constitutional- No activity change. No appetite change.  Eyes- Denies vision changes.  Respiratory- No shortness of breath.  Cardiovascular- No palpitations. No chest pain.  GI- No nausea or vomiting. No diarrhea or constipation. Denies abdominal pain.  Musculoskeletal- Denies joint swelling.  Extremities- No edema.  Neurological- Denies headaches. Denies dizziness.  Skin- No rashes.  Psychiatric/Behavioral- Denies significant anxiety, or depressed mood.     Objective   Weight reduction 3% since starting the Adipex      /80   Pulse 70   Temp 36.2 °C (97.1 °F) (Temporal)   Resp 18   Ht 1.803 m (5' 11\")   Wt 106 kg (232 lb 9.6 oz)   SpO2 98%   BMI 32.44 kg/m²     Allergies   Allergen Reactions    Iodides Unknown    Shellfish Derived Rash     Nausea    Vancomycin Rash       Constitutional-- Well-nourished.  No distress  Eyes- PERRL.  Conjunctiva normal.  Nose- Normal.  No rhinorrhea noted.  Throat- Oropharynx is clear and moist.  Neck- Supple with no " thyromegaly.  No significant cervical adenopathy noted.  Pulmonary/Chest- Breath sounds normal with normal effort.  No wheezing.  Heart- Regular rate and rhythm.  No murmur.  Abdomen- Soft and non-tender.  No masses noted.  Musculoskeletal- Normal ROM.  No significant joint swelling  Extremities- No edema.   Neurological- Alert.  No noted deficits.  Skin- Warm.  No rashes.  Psychiatric/Behavioral- Mood and affect normal.  Behavior normal.     Assessment/Plan   Patient will benefit from Phentermine Therapy, given the following:    Advised to take Adipex 37.5 mg in the morning 1-2 hours after breakfast  OARRS reviewed today  CSA Adipex 7-31-23      Benefits, risks, possible adverse effects to the medication discussed today      1. Mild depression        2. BMI 34.0-34.9,adult  phentermine (Adipex-P) 37.5 mg tablet      3. Class 1 obesity due to excess calories without serious comorbidity with body mass index (BMI) of 34.0 to 34.9 in adult  phentermine (Adipex-P) 37.5 mg tablet               Long talk. Treatment options reviewed.  Mild depression controlled.    Risk versus benefits of Adipex again reviewed.  Was not taking it during the holidays.  Discussed importance of natural sources of nutrition.  Advised patient to consume vegetables, salads, fruits, nuts, and proteins such as fish and chicken.  Discussed portion control.      Discussed the importance of routine exercise.      Advised patient to remain up to date on routine maintenance and health screening.      Continue and take your medications as prescribed.    Health Maintenance issues discussed.    Importance of healthy diet and regular exercise regimen discussed.    Follow-up as instructed or sooner if any problems or symptoms do not resolve as expected.       Scribe Attestation  By signing my name below, IKerrie Scribe   attest that this documentation has been prepared under the direction and in the presence of Naseem Aguila MD.

## 2024-01-31 ENCOUNTER — DOCUMENTATION (OUTPATIENT)
Dept: PHYSICAL THERAPY | Facility: CLINIC | Age: 34
End: 2024-01-31
Payer: COMMERCIAL

## 2024-01-31 NOTE — PROGRESS NOTES
Physical Therapy    Discharge Summary    Name: Sukhdeep Byrd  MRN: [id number]  : 1990  Date: 24    Discharge Summary: PT    Discharge Information: Date of discharge 2024, Date of last visit [id number], Date of evaluation 2023, Number of attended visits 9, Referred by Owen Mobley PA-C, and Referred for Scapular dyskinesia    Therapy Summary: Noncompliance    Discharge Status:  DC no contact with clinic > 30 days.       Rehab Discharge Reason: Failed to schedule and/or keep follow-up appointment(s)

## 2024-02-05 DIAGNOSIS — F32.A MILD DEPRESSION: ICD-10-CM

## 2024-02-05 RX ORDER — BUPROPION HYDROCHLORIDE 300 MG/1
300 TABLET ORAL EVERY MORNING
Qty: 90 TABLET | Refills: 1 | Status: SHIPPED | OUTPATIENT
Start: 2024-02-05 | End: 2024-03-12 | Stop reason: SDUPTHER

## 2024-02-05 NOTE — TELEPHONE ENCOUNTER
Rx Refill Request Telephone Encounter    Name:  Sukhdeep Byrd  : 1990     Medication Name:  BUPROPION XL  Dose (Optional):    300 MG  Quantity (Optional):      Directions (Optional):   1 TABLET DAILY    ALLERGIES:   ON FILE    Specific Pharmacy location:  [facility]    Date of last appointment:  24  Date of next appointment:  NONE    Best number to reach patient:  [phone]

## 2024-02-08 NOTE — PROGRESS NOTES
"Subjective   Patient ID: Sukhdeep Byrd is a 33 y.o. male who presents for IT band injury and Weight Management.  HPI  Weight loss management Follow up   Taking Adipex 37.5mg   Following up for month # 4  Has been eating a generally healthy diet  Exercises 2 x per week  What type of exercise , HIT program- lunges, body weight exercise's.   Patient last in office weight 232.9 lbs  Today's in office weight 226.2 lbs   Patient is - 20.4 lbs   Has previously taken Adipex   Co morbidities include- None  Any side effects noted? None    Also presents for possible left sided IT band injury. Patient admits that he is not sure how this happened, though that this was sciatic nerve pain. Patient admits that he has been going to his . Patient admits that a week after this happened his leg locked up on him. Patient admits that it took a week for him to be able to walk again. Patient admits that this has been ongoing x 1 month. Pain is 5/10. Patient admits that he can not lay down on his left thigh. Pain does not go past his thigh. Patient admits that at this moment his leg is achy.     Taking current medications which were reviewed.  Problem list discussed.    Overall doing well.  Eating okay.  Staying active.    Has no other new problem /question.        ROS  Constitutional- No activity change. No appetite change.  Eyes- Denies vision changes.  Respiratory- No shortness of breath.  Cardiovascular- No palpitations. No chest pain.  GI- No nausea or vomiting. No diarrhea or constipation. Denies abdominal pain.  Musculoskeletal- Denies joint swelling.  Extremities- No edema.  Neurological- Denies headaches. Denies dizziness.  Skin- No rashes.  Psychiatric/Behavioral- Denies significant anxiety, or depressed mood.     Objective   Weight reduction 9% since starting the Adipex    /58   Pulse 83   Resp 18   Ht 1.803 m (5' 11\")   Wt 103 kg (226 lb 3.2 oz)   SpO2 96%   BMI 31.55 kg/m² "     Allergies   Allergen Reactions    Iodides Unknown    Shellfish Derived Rash     Nausea    Vancomycin Rash       Constitutional-- Well-nourished.  No distress  Eyes- PERRL.  Conjunctiva normal.  Nose- Normal.  No rhinorrhea noted.  Throat- Oropharynx is clear and moist.  Neck- Supple with no thyromegaly.  No significant cervical adenopathy noted.  Pulmonary/Chest- Breath sounds normal with normal effort.  No wheezing.  Heart- Regular rate and rhythm.  No murmur.  Abdomen- Soft and non-tender.  No masses noted.  Musculoskeletal- Normal ROM.  No significant joint swelling.  Low back upper buttock symptoms consistent with sciatica  Extremities- No edema.   Neurological- Alert.  No noted deficits.  Skin- Warm.    Psychiatric/Behavioral- Mood and affect normal.  Behavior normal.     Assessment/Plan   Patient will benefit from Phentermine Therapy, given the following:    Advised to take Adipex 37.5 mg in the morning 1-2 hours after breakfast  OARRS reviewed today  CSA Adipex 7/31/23  Does  the patient demonstrate dedication to weight loss treatment plan? Yes    Benefits, risks, possible adverse effects to the medication discussed today      1. Sciatica of left side  methylPREDNISolone (Medrol Dospak) 4 mg tablets      2. Encounter for weight management        3. BMI 34.0-34.9,adult  phentermine (Adipex-P) 37.5 mg tablet      4. Class 1 obesity due to excess calories without serious comorbidity with body mass index (BMI) of 34.0 to 34.9 in adult  phentermine (Adipex-P) 37.5 mg tablet      5. Tendonitis  methylPREDNISolone (Medrol Dospak) 4 mg tablets               Long talk. Treatment options reviewed.    Advised patient to remain up to date on routine maintenance.      Weight loss has been good.  Talked about weaning off Adipex.  She will take a half a pill in the later morning as discussed  Discussed importance of natural sources of nutrition.  Advised patient to consume vegetables, salads, fruits, nuts, and proteins  such as fish and chicken.  Discussed portion control.      Educated on tendonitis.  Educated on sciatica.  Take Methylprednisolone as discussed.      Continue and take your medications as prescribed.    Health Maintenance issues discussed.    Importance of healthy diet and regular exercise regimen discussed.      Follow-up as instructed or sooner if any problems or symptoms do not resolve as expected.        Scribe Attestation  By signing my name below, Kerrie ARTHUR Scribe   attest that this documentation has been prepared under the direction and in the presence of Naseem Aguila MD.

## 2024-02-12 ENCOUNTER — OFFICE VISIT (OUTPATIENT)
Dept: PRIMARY CARE | Facility: CLINIC | Age: 34
End: 2024-02-12
Payer: COMMERCIAL

## 2024-02-12 VITALS
HEART RATE: 83 BPM | SYSTOLIC BLOOD PRESSURE: 120 MMHG | OXYGEN SATURATION: 96 % | HEIGHT: 71 IN | BODY MASS INDEX: 31.67 KG/M2 | RESPIRATION RATE: 18 BRPM | WEIGHT: 226.2 LBS | DIASTOLIC BLOOD PRESSURE: 58 MMHG

## 2024-02-12 DIAGNOSIS — M54.32 SCIATICA OF LEFT SIDE: Primary | ICD-10-CM

## 2024-02-12 DIAGNOSIS — E66.09 CLASS 1 OBESITY DUE TO EXCESS CALORIES WITHOUT SERIOUS COMORBIDITY WITH BODY MASS INDEX (BMI) OF 34.0 TO 34.9 IN ADULT: ICD-10-CM

## 2024-02-12 DIAGNOSIS — M77.9 TENDONITIS: ICD-10-CM

## 2024-02-12 DIAGNOSIS — Z76.89 ENCOUNTER FOR WEIGHT MANAGEMENT: ICD-10-CM

## 2024-02-12 PROCEDURE — 3008F BODY MASS INDEX DOCD: CPT | Performed by: FAMILY MEDICINE

## 2024-02-12 PROCEDURE — 99213 OFFICE O/P EST LOW 20 MIN: CPT | Performed by: FAMILY MEDICINE

## 2024-02-12 PROCEDURE — 1036F TOBACCO NON-USER: CPT | Performed by: FAMILY MEDICINE

## 2024-02-12 RX ORDER — PHENTERMINE HYDROCHLORIDE 37.5 MG/1
TABLET ORAL
Qty: 30 TABLET | Refills: 0 | Status: SHIPPED | OUTPATIENT
Start: 2024-02-12

## 2024-02-12 RX ORDER — METHYLPREDNISOLONE 4 MG/1
TABLET ORAL
Qty: 21 TABLET | Refills: 0 | Status: SHIPPED | OUTPATIENT
Start: 2024-02-12

## 2024-02-28 ENCOUNTER — APPOINTMENT (OUTPATIENT)
Dept: PRIMARY CARE | Facility: CLINIC | Age: 34
End: 2024-02-28
Payer: COMMERCIAL

## 2024-03-12 DIAGNOSIS — F32.A MILD DEPRESSION: ICD-10-CM

## 2024-03-12 RX ORDER — BUPROPION HYDROCHLORIDE 300 MG/1
300 TABLET ORAL EVERY MORNING
Qty: 90 TABLET | Refills: 1 | Status: SHIPPED | OUTPATIENT
Start: 2024-03-12

## 2024-03-12 NOTE — TELEPHONE ENCOUNTER
Rx Refill Request Telephone Encounter    Name:  Sukhdeep Byrd  : 1990     Medication Name:  Wellbutrin XL  Dose (Optional):    300 mg  Quantity (Optional):    #90  Directions (Optional):   Take 1 tablet (300 mg) by mouth once daily in the morning. Do not crush, chew or split.     ALLERGIES:   see list     Specific Pharmacy location:  [facility]    Date of last appointment:  24  Date of next appointment:  none     Best number to reach patient:  [phone]

## 2024-03-14 ENCOUNTER — APPOINTMENT (OUTPATIENT)
Dept: UROLOGY | Facility: CLINIC | Age: 34
End: 2024-03-14
Payer: COMMERCIAL

## 2024-03-15 ENCOUNTER — OFFICE VISIT (OUTPATIENT)
Dept: DERMATOLOGY | Facility: CLINIC | Age: 34
End: 2024-03-15
Payer: COMMERCIAL

## 2024-03-15 DIAGNOSIS — L81.4 LENTIGO: ICD-10-CM

## 2024-03-15 DIAGNOSIS — D22.60 MELANOCYTIC NEVI OF UNSPECIFIED UPPER LIMB, INCLUDING SHOULDER: ICD-10-CM

## 2024-03-15 DIAGNOSIS — Z12.83 ENCOUNTER FOR SCREENING FOR MALIGNANT NEOPLASM OF SKIN: Primary | ICD-10-CM

## 2024-03-15 DIAGNOSIS — D18.01 HEMANGIOMA OF SKIN: ICD-10-CM

## 2024-03-15 DIAGNOSIS — L57.8 PHOTOAGING OF SKIN: ICD-10-CM

## 2024-03-15 DIAGNOSIS — L21.9 SEBORRHEIC DERMATITIS: ICD-10-CM

## 2024-03-15 DIAGNOSIS — L71.9 ROSACEA: ICD-10-CM

## 2024-03-15 DIAGNOSIS — D22.5 MELANOCYTIC NEVI OF TRUNK: ICD-10-CM

## 2024-03-15 DIAGNOSIS — D22.39 FIBROUS PAPULE OF NOSE: ICD-10-CM

## 2024-03-15 DIAGNOSIS — L85.3 XEROSIS CUTIS: ICD-10-CM

## 2024-03-15 PROCEDURE — 3008F BODY MASS INDEX DOCD: CPT | Performed by: DERMATOLOGY

## 2024-03-15 PROCEDURE — 99204 OFFICE O/P NEW MOD 45 MIN: CPT | Performed by: DERMATOLOGY

## 2024-03-15 PROCEDURE — 1036F TOBACCO NON-USER: CPT | Performed by: DERMATOLOGY

## 2024-03-15 RX ORDER — SULFACETAMIDE SODIUM, SULFUR 100; 50 MG/G; MG/G
EMULSION TOPICAL
Qty: 227 G | Refills: 11 | Status: SHIPPED | OUTPATIENT
Start: 2024-03-15

## 2024-03-15 RX ORDER — HYDROCORTISONE 25 MG/G
CREAM TOPICAL
Qty: 30 G | Refills: 3 | Status: SHIPPED | OUTPATIENT
Start: 2024-03-15

## 2024-03-15 ASSESSMENT — DERMATOLOGY PATIENT ASSESSMENT
DO YOU USE A TANNING BED: NO
DO YOU USE SUNSCREEN: OCCASIONALLY
DO YOU HAVE ANY NEW OR CHANGING LESIONS: YES
ARE YOU AN ORGAN TRANSPLANT RECIPIENT: NO

## 2024-03-15 ASSESSMENT — ITCH NUMERIC RATING SCALE: HOW SEVERE IS YOUR ITCHING?: 0

## 2024-03-15 ASSESSMENT — DERMATOLOGY QUALITY OF LIFE (QOL) ASSESSMENT
RATE HOW BOTHERED YOU ARE BY EFFECTS OF YOUR SKIN PROBLEMS ON YOUR ACTIVITIES (EG, GOING OUT, ACCOMPLISHING WHAT YOU WANT, WORK ACTIVITIES OR YOUR RELATIONSHIPS WITH OTHERS): 0 - NEVER BOTHERED
WHAT SINGLE SKIN CONDITION LISTED BELOW IS THE PATIENT ANSWERING THE QUALITY-OF-LIFE ASSESSMENT QUESTIONS ABOUT: NONE OF THE ABOVE
ARE THERE EXCLUSIONS OR EXCEPTIONS FOR THE QUALITY OF LIFE ASSESSMENT: NO
RATE HOW EMOTIONALLY BOTHERED YOU ARE BY YOUR SKIN PROBLEM (FOR EXAMPLE, WORRY, EMBARRASSMENT, FRUSTRATION): 0 - NEVER BOTHERED
RATE HOW BOTHERED YOU ARE BY SYMPTOMS OF YOUR SKIN PROBLEM (EG, ITCHING, STINGING BURNING, HURTING OR SKIN IRRITATION): 0 - NEVER BOTHERED

## 2024-03-15 ASSESSMENT — PATIENT GLOBAL ASSESSMENT (PGA): PATIENT GLOBAL ASSESSMENT: PATIENT GLOBAL ASSESSMENT:  1 - CLEAR

## 2024-03-15 NOTE — PROGRESS NOTES
Subjective     Sukhdeep Byrd is a 33 y.o. male who presents for the following: Skin Check (Pt here today for FBSE. Has lesions on nose and stomach. No personal hx. Fhx of skin cancer-Mother, sister, grandmother. 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     Review of Systems:  No other skin or systemic complaints other than what is documented elsewhere in the note.    The following portions of the chart were reviewed this encounter and updated as appropriate:         Skin Cancer History  No skin cancer on file.      Specialty Problems    None       Objective   Well appearing patient in no apparent distress; mood and affect are within normal limits.    A full examination was performed including scalp, head, eyes, ears, nose, lips, neck, chest, axillae, abdomen, back, buttocks, bilateral upper extremities, bilateral lower extremities, hands, feet, fingers, toes, fingernails, and toenails. All findings within normal limits unless otherwise noted below.    Assessment/Plan          Denies all

## 2024-03-15 NOTE — PROGRESS NOTES
Subjective     Sukhdeep Byrd is a 33 y.o. male who presents for the following: Skin Check (Pt here today for FBSE. Has lesions on nose and stomach. No personal hx. Fhx of skin cancer-Mother, sister, grandmother. 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     Review of Systems:  No other skin or systemic complaints other than what is documented elsewhere in the note.    The following portions of the chart were reviewed this encounter and updated as appropriate:         Skin Cancer History  No skin cancer on file.      Specialty Problems    None       Objective   Well appearing patient in no apparent distress; mood and affect are within normal limits.    A full examination was performed including scalp, head, eyes, ears, nose, lips, neck, chest, axillae, abdomen, back, buttocks, bilateral upper extremities, bilateral lower extremities, hands, feet, fingers, toes, fingernails, and toenails. All findings within normal limits unless otherwise noted below.    Assessment/Plan   1. Encounter for screening for malignant neoplasm of skin  No suspicious lesions noted on examination today    The risk of chronic, cumulative sun damage and risk of development of skin cancer was reviewed today.   The importance of sun protection was reviewed: including the use of a broad spectrum sunscreen that protects against both UVA/UVB rays, with ingredients such as Zinc oxide or titanium dioxide, wearing sun protective clothing and sun avoidance. We reviewed the warning signs of non-melanoma skin cancer and ABCDEs of melanoma  Please follow up should you notice any new or changing pre-existing skin lesion.    2. Photoaging of skin  Mottled pigmentation with telangiectasias and brown reticular macules in sun exposed areas of the body.    The risk of chronic, cumulative sun damage and risk of development of skin cancer was reviewed today.   The importance of sun protection was reviewed: including the use  of a broad spectrum sunscreen that protects against both UVA/UVB rays, with ingredients such as Zinc oxide or titanium dioxide, wearing sun protective clothing and sun avoidance. We reviewed the warning signs of non-melanoma skin cancer and ABCDEs of melanoma  Please follow up should you notice any new or changing pre-existing skin lesion.    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 Fibrous papule of nose (3)  Mid Tip of Nose (3)  Pink to erythematous papules, 1-2mm    The benign nature of these skin lesions were reviewed, no treatment is necessary.   Please follow up for any new or pre-existing lesion that is changing in size, shape, color, becomes painful, tender, itches or bleed.    4. Hemangioma of skin  Cherry red papules    The benign nature of these skin lesions were reviewed, no treatment is necessary.   Please follow up for any new or pre-existing lesion that is changing in size, shape, color, becomes painful, tender, itches or bleed.    5. Melanocytic nevi of trunk (3)  Abdomen (Lower Torso, Anterior), Chest (Upper Torso, Anterior), Torso - Posterior (Back)  Tan-brown symmetric macules and papules    Clinically benign appearing nevi, no treatment is necessary.  The importance of sun protection was reviewed: including the use of a broad spectrum sunscreen that protects against both UVA/UVB rays, with ingredients such as Zinc oxide or titanium dioxide, wearing sun protective clothing and sun avoidance.   ABCDEs of melanoma reviewed.  Please follow up should you notice any new or changing pre-existing skin lesion.    6. Melanocytic nevi of unspecified upper limb, including shoulder (2)  Left Arm, Right Arm  Scattered, uniform and benign-appearing, regular brown melanocytic papules and macules.    Clinically benign appearing nevi, no treatment is necessary.  The importance of sun protection was reviewed: including the use of a broad spectrum sunscreen that protects against both  UVA/UVB rays, with ingredients such as Zinc oxide or titanium dioxide, wearing sun protective clothing and sun avoidance.   ABCDEs of melanoma reviewed.  Please follow up should you notice any new or changing pre-existing skin lesion.    7. Xerosis cutis (4)  Left Foot - Anterior, Left Lower Leg - Anterior, Right Foot - Anterior, Right Lower Leg - Anterior  Fine, ashy, pale to white scale diffusely over skin. Excoriations.    Dry skin is common, often worse during the dry months of the year and may also worsen as we age.  A gentle skin care regimen includes:  -washing with a mild soap without fragrance such as Dove for sensitive skin, Cetaphil or Cerave.  -pat dry after washing and then apply a thick moisturizer such as Cerave cream or Cetaphil cream. Creams are thicker than lotions and often do a better job of restoring the skin barrier.      8. Lentigo (4)  Left Shoulder - Posterior, Left Upper Back, Right Shoulder - Posterior, Right Upper Back  Scattered tan macules in sun-exposed areas.    These are benign skin lesions due to sun exposure. They will darken in response to sun exposure. They should be monitored for change in size, shape or color.  These lesions can be treated cosmetically with topical creams, liquid nitrogen and a variety of lasers.    9. Seborrheic dermatitis  Left Eyebrow, Mid Back, Right Eyebrow  Erythema with overlying greasy scale.    The chronic and intermittently flaring nature of this skin condition was discussed with patient today.   This is due to a yeast that everyone has on their skin that results in redness, dryness and in some patients itching.    Various treatment options were reviewed including topical antifungals and topical steroids depending upon severity and symptoms. Patient advised we cannot cure this condition, but it can be controlled.     Begin the following treatment:  -sulfacetamide sodium - sulfur 10-5% cleanser lather for 2 minutes onto affected areas then rinse once  daily  - Start hydrocortisone 2.5% cream - face, upper back. Patient to apply 2x daily x 2 wks then decrease to 2x/day 2 days per week. Can repeat full 2 week course as often as every 6-8 weeks as needed for flaring. Side effects of topical steroids includes, but is not limited to skin atrophy and dyspigmentation.      Related Medications  hydrocortisone 2.5 % cream  Apply to upper back, eyebrows 2x daily x 2 weeks then 2x daily 2-3 days/wk    10. Rosacea  Head - Anterior (Face)  Mid face erythema with telangiectasias and scattered inflammatory papules.    The chronic and intermittently flaring nature of the skin condition was discussed with the patient today. Discussed common triggers associated with rosacea including sun exposure, spicy foods, alcohol, and stress. The various treatment options and risks and benefits reviewed. Patient to begin sulfacetamide sodium - sulfur 10-5% cleanser - lather for 2 minutes then rinse once daily    sulfacetamide sodium-sulfur (Avar) 10-5 % (w/w) topical emulsion - Head - Anterior (Face)  Lather onto the face, upper back for 1-2 minutes then rinse once daily      Follow up in 1 year for FBSE

## 2024-05-23 NOTE — PROGRESS NOTES
Subjective   Patient ID: Sukhdeep Byrd is a 33 y.o. male who presents for No chief complaint on file..    HPI    Patient presents today for hip pain. *** hip affected. Ongoing x ***. Describes the pain as ***. Rates  the pain as ***. Has tried ***.    Taking current medications which were reviewed.  Problem list discussed.    Overall doing well.  Eating okay.  Staying active.    Has no other new problem /question.        ROS  Constitutional- No activity change. No appetite change.  Eyes- Denies vision changes.  Respiratory- No shortness of breath.  Cardiovascular- No palpitations. No chest pain.  GI- No nausea or vomiting. No diarrhea or constipation. Denies abdominal pain.  Musculoskeletal- Denies joint swelling.  Extremities- No edema.  Neurological- Denies headaches. Denies dizziness.  Skin- No rashes.  Psychiatric/Behavioral- Denies significant anxiety, or depressed mood.     Objective     There were no vitals taken for this visit.    Allergies   Allergen Reactions    Iodides Unknown    Shellfish Derived Rash     Nausea    Vancomycin Rash       Constitutional-- Well-nourished.  No distress  Head- unremarkable.  Ears- TMs clear.  Eyes- PERRL.  Conjunctiva normal.  Nose- Normal.  No rhinorrhea noted.  Throat- Oropharynx is clear and moist.  Neck- Supple with no thyromegaly.  No significant cervical adenopathy noted.  Pulmonary/Chest- Breath sounds normal with normal effort.  No wheezing.  Heart- Regular rate and rhythm.  No murmur.  Abdomen- Soft and non-tender.  No masses noted.  Musculoskeletal- Normal ROM.  No significant joint swelling  Extremities- No edema.   Neurological- Alert.  No noted deficits.  Skin- Warm.  No rashes.  Psychiatric/Behavioral- Mood and affect normal.  Behavior normal.     Assessment/Plan   No diagnosis found.       Long talk. Treatment options reviewed.    Continue and take your medications as prescribed.    Health Maintenance issues discussed.    Importance of healthy diet and  regular exercise regimen discussed.    We will contact you with any test results ordered. If you do not hear from us, please contact.    Follow-up as instructed or sooner if any problems or symptoms do not resolve as expected.

## 2024-05-28 ENCOUNTER — APPOINTMENT (OUTPATIENT)
Dept: PRIMARY CARE | Facility: CLINIC | Age: 34
End: 2024-05-28
Payer: COMMERCIAL

## 2024-07-08 DIAGNOSIS — F32.A MILD DEPRESSION: Primary | ICD-10-CM

## 2024-07-08 RX ORDER — BUPROPION HYDROCHLORIDE 300 MG/1
300 TABLET ORAL EVERY MORNING
Qty: 90 TABLET | Refills: 1 | Status: SHIPPED | OUTPATIENT
Start: 2024-07-08

## 2024-07-08 NOTE — TELEPHONE ENCOUNTER
Rx Refill Request Telephone Encounter    Name:  Sukhdeep Byrd  : 1990     Medication Name:  WELLBUTRIN XL  Dose (Optional):    300 MG  Quantity (Optional):    90  Directions (Optional):   TAKE DAILY    ALLERGIES:   SEE LIST    Specific Pharmacy location:  [facility] [place]    Date of last appointment:  02/15/2024  Date of next appointment:  NONE    Best number to reach patient:  [phone]

## 2024-07-26 ENCOUNTER — APPOINTMENT (OUTPATIENT)
Dept: RADIOLOGY | Facility: HOSPITAL | Age: 34
End: 2024-07-26
Payer: COMMERCIAL

## 2024-07-26 ENCOUNTER — HOSPITAL ENCOUNTER (EMERGENCY)
Facility: HOSPITAL | Age: 34
Discharge: HOME | End: 2024-07-26
Attending: STUDENT IN AN ORGANIZED HEALTH CARE EDUCATION/TRAINING PROGRAM
Payer: COMMERCIAL

## 2024-07-26 VITALS
SYSTOLIC BLOOD PRESSURE: 137 MMHG | DIASTOLIC BLOOD PRESSURE: 82 MMHG | TEMPERATURE: 97.3 F | OXYGEN SATURATION: 100 % | BODY MASS INDEX: 30.1 KG/M2 | RESPIRATION RATE: 18 BRPM | WEIGHT: 215 LBS | HEIGHT: 71 IN | HEART RATE: 89 BPM

## 2024-07-26 DIAGNOSIS — R10.31 RIGHT LOWER QUADRANT ABDOMINAL PAIN: Primary | ICD-10-CM

## 2024-07-26 DIAGNOSIS — I88.0 MESENTERIC ADENITIS: ICD-10-CM

## 2024-07-26 LAB
ABO GROUP (TYPE) IN BLOOD: NORMAL
ALBUMIN SERPL BCP-MCNC: 4.6 G/DL (ref 3.4–5)
ALP SERPL-CCNC: 71 U/L (ref 33–120)
ALT SERPL W P-5'-P-CCNC: 35 U/L (ref 10–52)
ANION GAP SERPL CALC-SCNC: 13 MMOL/L (ref 10–20)
ANTIBODY SCREEN: NORMAL
APPEARANCE UR: CLEAR
AST SERPL W P-5'-P-CCNC: 24 U/L (ref 9–39)
BASOPHILS # BLD AUTO: 0.02 X10*3/UL (ref 0–0.1)
BASOPHILS NFR BLD AUTO: 0.2 %
BILIRUB SERPL-MCNC: 1 MG/DL (ref 0–1.2)
BILIRUB UR STRIP.AUTO-MCNC: NEGATIVE MG/DL
BUN SERPL-MCNC: 26 MG/DL (ref 6–23)
CALCIUM SERPL-MCNC: 9.7 MG/DL (ref 8.6–10.3)
CHLORIDE SERPL-SCNC: 104 MMOL/L (ref 98–107)
CO2 SERPL-SCNC: 27 MMOL/L (ref 21–32)
COLOR UR: ABNORMAL
CREAT SERPL-MCNC: 0.84 MG/DL (ref 0.5–1.3)
EGFRCR SERPLBLD CKD-EPI 2021: >90 ML/MIN/1.73M*2
EOSINOPHIL # BLD AUTO: 0.09 X10*3/UL (ref 0–0.7)
EOSINOPHIL NFR BLD AUTO: 1 %
ERYTHROCYTE [DISTWIDTH] IN BLOOD BY AUTOMATED COUNT: 11.9 % (ref 11.5–14.5)
GLUCOSE SERPL-MCNC: 83 MG/DL (ref 74–99)
GLUCOSE UR STRIP.AUTO-MCNC: NORMAL MG/DL
HCT VFR BLD AUTO: 41.5 % (ref 41–52)
HGB BLD-MCNC: 13.8 G/DL (ref 13.5–17.5)
IMM GRANULOCYTES # BLD AUTO: 0.03 X10*3/UL (ref 0–0.7)
IMM GRANULOCYTES NFR BLD AUTO: 0.3 % (ref 0–0.9)
INR PPP: 1.3 (ref 0.9–1.1)
KETONES UR STRIP.AUTO-MCNC: NEGATIVE MG/DL
LEUKOCYTE ESTERASE UR QL STRIP.AUTO: NEGATIVE
LIPASE SERPL-CCNC: 20 U/L (ref 9–82)
LYMPHOCYTES # BLD AUTO: 1.57 X10*3/UL (ref 1.2–4.8)
LYMPHOCYTES NFR BLD AUTO: 17.3 %
MCH RBC QN AUTO: 29.7 PG (ref 26–34)
MCHC RBC AUTO-ENTMCNC: 33.3 G/DL (ref 32–36)
MCV RBC AUTO: 89 FL (ref 80–100)
MONOCYTES # BLD AUTO: 0.77 X10*3/UL (ref 0.1–1)
MONOCYTES NFR BLD AUTO: 8.5 %
NEUTROPHILS # BLD AUTO: 6.6 X10*3/UL (ref 1.2–7.7)
NEUTROPHILS NFR BLD AUTO: 72.7 %
NITRITE UR QL STRIP.AUTO: NEGATIVE
NRBC BLD-RTO: 0 /100 WBCS (ref 0–0)
PH UR STRIP.AUTO: 5.5 [PH]
PLATELET # BLD AUTO: 194 X10*3/UL (ref 150–450)
POTASSIUM SERPL-SCNC: 4.2 MMOL/L (ref 3.5–5.3)
PROT SERPL-MCNC: 7.6 G/DL (ref 6.4–8.2)
PROT UR STRIP.AUTO-MCNC: NEGATIVE MG/DL
PROTHROMBIN TIME: 14.6 SECONDS (ref 9.8–12.8)
RBC # BLD AUTO: 4.65 X10*6/UL (ref 4.5–5.9)
RBC # UR STRIP.AUTO: ABNORMAL /UL
RBC #/AREA URNS AUTO: NORMAL /HPF
RH FACTOR (ANTIGEN D): NORMAL
SODIUM SERPL-SCNC: 140 MMOL/L (ref 136–145)
SP GR UR STRIP.AUTO: 1.02
UROBILINOGEN UR STRIP.AUTO-MCNC: NORMAL MG/DL
WBC # BLD AUTO: 9.1 X10*3/UL (ref 4.4–11.3)
WBC #/AREA URNS AUTO: NORMAL /HPF

## 2024-07-26 PROCEDURE — 74177 CT ABD & PELVIS W/CONTRAST: CPT | Performed by: RADIOLOGY

## 2024-07-26 PROCEDURE — 81001 URINALYSIS AUTO W/SCOPE: CPT | Performed by: STUDENT IN AN ORGANIZED HEALTH CARE EDUCATION/TRAINING PROGRAM

## 2024-07-26 PROCEDURE — 36415 COLL VENOUS BLD VENIPUNCTURE: CPT | Performed by: STUDENT IN AN ORGANIZED HEALTH CARE EDUCATION/TRAINING PROGRAM

## 2024-07-26 PROCEDURE — 74177 CT ABD & PELVIS W/CONTRAST: CPT

## 2024-07-26 PROCEDURE — 96365 THER/PROPH/DIAG IV INF INIT: CPT | Mod: 59

## 2024-07-26 PROCEDURE — 85610 PROTHROMBIN TIME: CPT | Performed by: STUDENT IN AN ORGANIZED HEALTH CARE EDUCATION/TRAINING PROGRAM

## 2024-07-26 PROCEDURE — 2500000004 HC RX 250 GENERAL PHARMACY W/ HCPCS (ALT 636 FOR OP/ED): Performed by: STUDENT IN AN ORGANIZED HEALTH CARE EDUCATION/TRAINING PROGRAM

## 2024-07-26 PROCEDURE — 99284 EMERGENCY DEPT VISIT MOD MDM: CPT | Mod: 25

## 2024-07-26 PROCEDURE — 85025 COMPLETE CBC W/AUTO DIFF WBC: CPT | Performed by: STUDENT IN AN ORGANIZED HEALTH CARE EDUCATION/TRAINING PROGRAM

## 2024-07-26 PROCEDURE — 85025 COMPLETE CBC W/AUTO DIFF WBC: CPT

## 2024-07-26 PROCEDURE — 96376 TX/PRO/DX INJ SAME DRUG ADON: CPT

## 2024-07-26 PROCEDURE — 96367 TX/PROPH/DG ADDL SEQ IV INF: CPT

## 2024-07-26 PROCEDURE — 96375 TX/PRO/DX INJ NEW DRUG ADDON: CPT

## 2024-07-26 PROCEDURE — 86901 BLOOD TYPING SEROLOGIC RH(D): CPT | Performed by: STUDENT IN AN ORGANIZED HEALTH CARE EDUCATION/TRAINING PROGRAM

## 2024-07-26 PROCEDURE — 80053 COMPREHEN METABOLIC PANEL: CPT | Performed by: STUDENT IN AN ORGANIZED HEALTH CARE EDUCATION/TRAINING PROGRAM

## 2024-07-26 PROCEDURE — 2550000001 HC RX 255 CONTRASTS: Performed by: STUDENT IN AN ORGANIZED HEALTH CARE EDUCATION/TRAINING PROGRAM

## 2024-07-26 PROCEDURE — 36415 COLL VENOUS BLD VENIPUNCTURE: CPT

## 2024-07-26 PROCEDURE — 83690 ASSAY OF LIPASE: CPT | Performed by: STUDENT IN AN ORGANIZED HEALTH CARE EDUCATION/TRAINING PROGRAM

## 2024-07-26 PROCEDURE — 80053 COMPREHEN METABOLIC PANEL: CPT

## 2024-07-26 RX ORDER — ONDANSETRON HYDROCHLORIDE 2 MG/ML
4 INJECTION, SOLUTION INTRAVENOUS ONCE
Status: COMPLETED | OUTPATIENT
Start: 2024-07-26 | End: 2024-07-26

## 2024-07-26 RX ORDER — METRONIDAZOLE 500 MG/100ML
500 INJECTION, SOLUTION INTRAVENOUS ONCE
Status: COMPLETED | OUTPATIENT
Start: 2024-07-26 | End: 2024-07-26

## 2024-07-26 RX ORDER — MORPHINE SULFATE 4 MG/ML
4 INJECTION, SOLUTION INTRAMUSCULAR; INTRAVENOUS ONCE
Status: COMPLETED | OUTPATIENT
Start: 2024-07-26 | End: 2024-07-26

## 2024-07-26 RX ORDER — CEFTRIAXONE 2 G/50ML
2 INJECTION, SOLUTION INTRAVENOUS ONCE
Status: COMPLETED | OUTPATIENT
Start: 2024-07-26 | End: 2024-07-26

## 2024-07-26 RX ADMIN — SODIUM CHLORIDE, POTASSIUM CHLORIDE, SODIUM LACTATE AND CALCIUM CHLORIDE 1000 ML: 600; 310; 30; 20 INJECTION, SOLUTION INTRAVENOUS at 12:52

## 2024-07-26 RX ADMIN — ONDANSETRON 4 MG: 2 INJECTION INTRAMUSCULAR; INTRAVENOUS at 12:52

## 2024-07-26 RX ADMIN — MORPHINE SULFATE 4 MG: 4 INJECTION, SOLUTION INTRAMUSCULAR; INTRAVENOUS at 16:03

## 2024-07-26 RX ADMIN — IOHEXOL 75 ML: 350 INJECTION, SOLUTION INTRAVENOUS at 14:38

## 2024-07-26 RX ADMIN — METRONIDAZOLE 500 MG: 5 INJECTION, SOLUTION INTRAVENOUS at 14:02

## 2024-07-26 RX ADMIN — ONDANSETRON 4 MG: 2 INJECTION INTRAMUSCULAR; INTRAVENOUS at 16:03

## 2024-07-26 RX ADMIN — MORPHINE SULFATE 4 MG: 4 INJECTION, SOLUTION INTRAMUSCULAR; INTRAVENOUS at 12:52

## 2024-07-26 RX ADMIN — CEFTRIAXONE SODIUM 2 G: 2 INJECTION, SOLUTION INTRAVENOUS at 13:21

## 2024-07-26 ASSESSMENT — COLUMBIA-SUICIDE SEVERITY RATING SCALE - C-SSRS
6. HAVE YOU EVER DONE ANYTHING, STARTED TO DO ANYTHING, OR PREPARED TO DO ANYTHING TO END YOUR LIFE?: NO
2. HAVE YOU ACTUALLY HAD ANY THOUGHTS OF KILLING YOURSELF?: NO
1. IN THE PAST MONTH, HAVE YOU WISHED YOU WERE DEAD OR WISHED YOU COULD GO TO SLEEP AND NOT WAKE UP?: NO

## 2024-07-26 ASSESSMENT — PAIN DESCRIPTION - FREQUENCY: FREQUENCY: CONSTANT/CONTINUOUS

## 2024-07-26 ASSESSMENT — PAIN DESCRIPTION - LOCATION
LOCATION: ABDOMEN
LOCATION: ABDOMEN

## 2024-07-26 ASSESSMENT — PAIN SCALES - GENERAL
PAINLEVEL_OUTOF10: 7
PAINLEVEL_OUTOF10: 4
PAINLEVEL_OUTOF10: 4
PAINLEVEL_OUTOF10: 7

## 2024-07-26 ASSESSMENT — LIFESTYLE VARIABLES
EVER HAD A DRINK FIRST THING IN THE MORNING TO STEADY YOUR NERVES TO GET RID OF A HANGOVER: NO
TOTAL SCORE: 0
HAVE YOU EVER FELT YOU SHOULD CUT DOWN ON YOUR DRINKING: NO
HAVE PEOPLE ANNOYED YOU BY CRITICIZING YOUR DRINKING: NO
EVER FELT BAD OR GUILTY ABOUT YOUR DRINKING: NO

## 2024-07-26 ASSESSMENT — PAIN DESCRIPTION - ORIENTATION
ORIENTATION: RIGHT;LOWER
ORIENTATION: RIGHT

## 2024-07-26 ASSESSMENT — PAIN - FUNCTIONAL ASSESSMENT
PAIN_FUNCTIONAL_ASSESSMENT: 0-10
PAIN_FUNCTIONAL_ASSESSMENT: 0-10

## 2024-07-26 ASSESSMENT — PAIN DESCRIPTION - PROGRESSION: CLINICAL_PROGRESSION: GRADUALLY WORSENING

## 2024-07-26 ASSESSMENT — PAIN DESCRIPTION - ONSET: ONSET: ONGOING

## 2024-07-26 ASSESSMENT — PAIN DESCRIPTION - PAIN TYPE: TYPE: ACUTE PAIN

## 2024-07-26 ASSESSMENT — PAIN DESCRIPTION - DESCRIPTORS
DESCRIPTORS: CRAMPING;SHARP
DESCRIPTORS: SHARP;SHOOTING

## 2024-07-26 NOTE — DISCHARGE INSTRUCTIONS
Seek immediate medical attention if you develop: increasing abdominal pain, increasing nausea, increasing vomiting, increasing diarrhea, fever, or worsening symptoms.  For a general surgeon we recommend he follow-up with gastroenterology and consider colonoscopy.  Should you have any increasing pain, fever, inability to eat or drink or recurrent vomiting I recommend he come back to the emergency room for further evaluation.

## 2024-07-26 NOTE — ED PROVIDER NOTES
EMERGENCY DEPARTMENT ENCOUNTER      Pt Name: Sukhdeep Byrd  MRN: [id number]  Birthdate 1990  Date of evaluation: 7/26/2024  Provider: Chelsea Pan MD    CHIEF COMPLAINT       Chief Complaint   Patient presents with    Abdominal Pain     Since Sunday RLQ, patient endorses diarrhea and nausea.          HISTORY OF PRESENT ILLNESS    The patient is a 33-year-old male with a history irritable bowel syndrome who presents to the emergency department with right lower quadrant abdominal that he woke up with in the morning 2 days ago after having generalized/periumbilical abdominal discomfort for 2 days prior.  The patient states that his pain has been constant and sharp since 2 days ago.  Patient rates his abdominal pain a 7 out of 10 at baseline but may go up to a 10 out of 10 with certain motions.  The patient states that eating makes his abdominal pain worse and burping or using the restroom makes it slightly better.  The patient took ibuprofen for his abdominal pain yesterday but it did not help.  The patient states that pressing on the left lower quadrant of his abdomen elicits sharp pain on the right lower quadrant.  The patient has never had this type of abdominal pain in the past before.  The patient has had a cholecystectomy as well as an abdominal lysis of adhesions surgery for SBO.  The patient denies the use of any blood thinners.  The patient endorses some nausea but no vomiting.  The patient denies any trauma to the abdomen.      History provided by:  Patient   used: No        Nursing Notes were reviewed.    PAST MEDICAL HISTORY     Past Medical History:   Diagnosis Date    Acute upper respiratory infection, unspecified 06/30/2020    Acute URI    Body mass index (BMI) 34.0-34.9, adult 01/17/2022    BMI 34.0-34.9,adult    Cough, unspecified 02/11/2020    Cough in adult    Cough, unspecified 04/07/2020    Cough in adult    Encounter for follow-up examination after completed treatment  Head,  normocephalic,  atraumatic,  Face,  Face within normal limits,  Ears,  External ears within normal limits,  Nose/Nasopharynx,  External nose  normal appearance,  nares patent,  no nasal discharge,  Mouth and Throat,  Oral cavity appearance normal,  Breath odor normal,  Lips,  Appearance normal for conditions other than malignant neoplasm 08/12/2016    Postoperative follow-up    Impacted cerumen, unspecified ear 02/11/2020    Wax in ear    Nausea 08/24/2020    Moderate nausea    Other acute postprocedural pain 08/12/2016    Postoperative pain    Other conditions influencing health status 04/07/2020    Testicular/scrotal pain    Other obesity due to excess calories 01/17/2022    Class 1 obesity due to excess calories without serious comorbidity with body mass index (BMI) of 34.0 to 34.9 in adult    Other specified diseases of gallbladder 12/16/2015    Biliary dyskinesia    Pain in right leg 06/15/2020    Pain of right lower extremity    Pain in right wrist 03/10/2020    Right wrist pain    Personal history of other diseases of the digestive system 12/16/2015    History of hiatal hernia    Personal history of other diseases of the digestive system 12/16/2015    History of chronic cholecystitis    Personal history of other diseases of the musculoskeletal system and connective tissue 07/31/2020    History of tendinitis    Personal history of other diseases of the respiratory system 02/11/2020    History of upper respiratory infection    Personal history of other diseases of the respiratory system 02/11/2020    History of bronchitis    Personal history of other specified conditions 08/23/2019    History of diarrhea    Personal history of other specified conditions 08/23/2019    History of epigastric pain    Vomiting, unspecified 03/10/2020    Vomiting alone         SURGICAL HISTORY       Past Surgical History:   Procedure Laterality Date    ABDOMINAL ADHESION SURGERY  08/17/2016    Laparoscopic Lysis Of Intestinal Adhesions    CHOLECYSTECTOMY  11/30/2015    Cholecystectomy Laparoscopic         CURRENT MEDICATIONS       Previous Medications    BUPROPION XL (WELLBUTRIN XL) 300 MG 24 HR TABLET    Take 1 tablet (300 mg) by mouth once daily in the morning. Do not crush, chew, or split.    HYDROCORTISONE 2.5 % CREAM     Apply to upper back, eyebrows 2x daily x 2 weeks then 2x daily 2-3 days/wk    METHYLPREDNISOLONE (MEDROL DOSPAK) 4 MG TABLETS    Take as directed on package.    PHENTERMINE (ADIPEX-P) 37.5 MG TABLET    TAKE 1 DAILY EVERY MORNING    SULFACETAMIDE SODIUM-SULFUR (AVAR) 10-5 % (W/W) TOPICAL EMULSION    Lather onto the face, upper back for 1-2 minutes then rinse once daily       ALLERGIES     Iodides, Shellfish derived, and Vancomycin    FAMILY HISTORY     No family history on file.       SOCIAL HISTORY       Social History     Socioeconomic History    Marital status:    Tobacco Use    Smoking status: Never    Smokeless tobacco: Never       SCREENINGS                        PHYSICAL EXAM    (up to 7 for level 4, 8 or more for level 5)     ED Triage Vitals [07/26/24 1159]   Temperature Heart Rate Respirations BP   36.3 °C (97.3 °F) 94 18 (!) 148/92      Pulse Ox Temp Source Heart Rate Source Patient Position   100 % Tympanic Monitor Sitting      BP Location FiO2 (%)     Right arm --       Physical Exam  Constitutional:       General: He is not in acute distress.     Appearance: He is well-developed. He is not ill-appearing, toxic-appearing or diaphoretic.   HENT:      Head: Normocephalic and atraumatic.   Cardiovascular:      Rate and Rhythm: Normal rate and regular rhythm.      Heart sounds: Normal heart sounds. No murmur heard.     No friction rub. No gallop.   Pulmonary:      Effort: Pulmonary effort is normal. No respiratory distress.      Breath sounds: Normal breath sounds. No stridor. No wheezing, rhonchi or rales.   Chest:      Chest wall: No tenderness.   Abdominal:      General: Abdomen is flat.      Palpations: Abdomen is soft.      Tenderness: There is abdominal tenderness in the right lower quadrant. Positive signs include Rovsing's sign, McBurney's sign, psoas sign and obturator sign.   Genitourinary:     Testes: Normal. Cremasteric reflex is present.         Right: Mass, tenderness or swelling not  present.         Left: Mass, tenderness or swelling not present.   Skin:     General: Skin is warm.      Coloration: Skin is not cyanotic, jaundiced, mottled or pale.      Findings: No erythema or rash.   Neurological:      General: No focal deficit present.      Mental Status: He is alert.   Psychiatric:         Mood and Affect: Mood normal. Mood is not anxious or depressed.         Behavior: Behavior normal.          DIAGNOSTIC RESULTS     LABS:  Labs Reviewed   COMPREHENSIVE METABOLIC PANEL - Abnormal       Result Value    Glucose 83      Sodium 140      Potassium 4.2      Chloride 104      Bicarbonate 27      Anion Gap 13      Urea Nitrogen 26 (*)     Creatinine 0.84      eGFR >90      Calcium 9.7      Albumin 4.6      Alkaline Phosphatase 71      Total Protein 7.6      AST 24      Bilirubin, Total 1.0      ALT 35     PROTIME-INR - Abnormal    Protime 14.6 (*)     INR 1.3 (*)    CBC WITH AUTO DIFFERENTIAL    WBC 9.1      nRBC 0.0      RBC 4.65      Hemoglobin 13.8      Hematocrit 41.5      MCV 89      MCH 29.7      MCHC 33.3      RDW 11.9      Platelets 194      Neutrophils % 72.7      Immature Granulocytes %, Automated 0.3      Lymphocytes % 17.3      Monocytes % 8.5      Eosinophils % 1.0      Basophils % 0.2      Neutrophils Absolute 6.60      Immature Granulocytes Absolute, Automated 0.03      Lymphocytes Absolute 1.57      Monocytes Absolute 0.77      Eosinophils Absolute 0.09      Basophils Absolute 0.02     LIPASE   URINALYSIS WITH REFLEX MICROSCOPIC   TYPE AND SCREEN       All other labs were within normal range or not returned as of this dictation.    Imaging  CT abdomen pelvis w IV contrast    (Results Pending)        Procedures  Procedures     EMERGENCY DEPARTMENT COURSE/MDM:     ED Course as of 07/26/24 1608   Fri Jul 26, 2024   1305 Patient with 2 to 3 days of abdominal discomfort that is now migrated the right lower quadrant with positive rebound, psoas sign, focal rebound tenderness, Rovsing sign  and pain McBurney's point.  He is a clinical diagnosis of appendicitis.  Based on this 1 L IV fluids, morphine, Zofran as well as intra-abdominal antibiotics in the form of Rocephin, metronidazole to be given.  Call placed for general surgery consultation at this time.  Patient advised as well as nursing to remain NPO.  Preoperative type and screen and PT/INR ordered. [TL]   1306 Last oral intake reportedly at 8 AM this morning. [TL]   1330 Resident spoke to Dr. Jett who recommends awaiting CT results before any further surgical evaluation or intervention.  [TL]   1529 IMPRESSION:  1.  Enlarged right lower quadrant mesenteric lymph nodes, perhaps  related to mesenteric adenitis. No additional inflammatory process in  the right lower quadrant is identified, specifically the appendix and  terminal ileum appear unremarkable. Suggest short interval follow-up  CT in 2-3 months to document resolution and exclude underlying  malignancy. Alternatively, nonemergent PET/CT could also be  considered.  2. Mild left hydroureter is slightly worsened in 3 years ago, perhaps  related to chronic partial ureterovesical junction obstruction.   [TL]   1529 Requested general surgeon to come evaluate the patient at bedside at this time given concerning exam with normal appearing appendix on CT for further recommendations. [TL]   1605 Spoke to the general surgeon Dr. Jett at length he does feel the patient does not have appendicitis and can safely be returned home.  Patient was given return precautions.  Diagnosed with mesenteric adenitis advised to use NSAIDs at home.  He was given strict return precautions and was made aware that this does not fully exclude appendicitis.  I did discuss possibly of serial abdominal exams with the general surgeon who did not feel it was warranted at this time.  She recommended outpatient GI follow-up which patient was made aware of.  No sign of left shift, leukocytosis, fever or other SIRS  criteria at this time. [TL]      ED Course User Index  [TL] Kirk Avila, DO         Diagnoses as of 07/26/24 1608   Right lower quadrant abdominal pain   Mesenteric adenitis        Medical Decision Making  Zofran was given to the patient in the emergency department for his nausea.  The patient was given morphine and 1 L of normal saline for pain control.  The patient was started on Rocephin and metronidazole in the emergency department for acute appendicitis because the patient's clinical findings are consistent with acute appendicitis.  The patient had his testes examined for clinical signs of testicular torsion or epididymitis.  The patient did not have any clinical signs of testicular torsion or epididymitis.  The testes were unremarkable.  The cremasteric reflexes were intact. The patient received a urinalysis to look for signs of a urinary tract infection.  The patient's urinalysis was negative for signs of urinary tract infection.  The patient received a CBC and CMP to look for possible elevated white blood cell count or decreased hemoglobin indicating bleeding, I will as well as electrolyte disturbances.  The patient's lipase was not elevated, indicating the patient is unlikely to have acute pancreatitis.  The patient's CBC and CMP did not show any significant abnormalities. The patient received a type and screen in preparation for blood transfusion in case of surgery complications.  A CT of the abdomen pelvis was performed to assess for inflammation of the appendix as well as for other intra-abdominal processes. The patient's CT scan of the abdomen pelvis with IV contrast did not show any signs of acute pancreatitis.  The CT scan showed a large right mesenteric lymph nodes concerning for lymphadenitis.  The general surgeon was consulted about the patient's physical exam findings and the patient's CT scan.  The general surgeon agreed that the patient does not need to be admitted for appendectomy, and  recommend that the patient be follow-up with GI on an outpatient basis to manage his adenitis.  The patient was discharged with GI follow-up and return precautions to the emergency department.  The patient was advised to take Motrin for pain management in the interim.        Patient and or family in agreement and understanding of treatment plan.  All questions answered.      I reviewed the case with the attending ED physician. The attending ED physician agrees with the plan. Patient and/or patient´s representative was counseled regarding labs, imaging, likely diagnosis, and plan. All questions were answered.    ED Medications administered this visit:    Medications   lactated Ringer's bolus 1,000 mL (1,000 mL intravenous New Bag 7/26/24 1252)   cefTRIAXone (Rocephin) 2 g in dextrose (iso) IV 50 mL (has no administration in time range)   metroNIDAZOLE (Flagyl) 500 mg in sodium chloride (iso)  mL (has no administration in time range)   morphine injection 4 mg (4 mg intravenous Given 7/26/24 1252)   ondansetron (Zofran) injection 4 mg (4 mg intravenous Given 7/26/24 1252)       New Prescriptions from this visit:    New Prescriptions    No medications on file       Follow-up:  No follow-up provider specified.      Final Impression: No diagnosis found.      (Please note that portions of this note were completed with a voice recognition program.  Efforts were made to edit the dictations but occasionally words are mis-transcribed.)     Chelsea Pan MD  Resident  07/26/24 1617       Chelsea Pan MD  Resident  07/26/24 1620       Chelsea Pan MD  Resident  07/26/24 1621      I performed a history and physical examination of Sukhdeep Byrd and discussed his management with Dr. Pan.  I agree with the history, physical, assessment, and plan of care, with the following exceptions: None    I was present for the following procedures: None  Time Spent in Critical Care of the patient: None  Time spent in  discussions with the patient and family: 35    Kirk Avila, DO Kirk Avila,   07/27/24 5550

## 2024-07-26 NOTE — SIGNIFICANT EVENT
Called by the ER to discuss this patient.  This is a 33-year-old gentleman with abdominal pain since Sunday.  Patient does have a history of IBS.  Clinically he has normal vital signs, no fever/chills, labs are all within normal limits, no white count or left shift.  CT scan was just completed and discussed with radiology.  CT shows a normal terminal ileum and cecum, including normal appendix.  He does have some mesenteric lymphadenopathy which needs to be followed up in a few months to make sure it resolves.  This patient does not have any acute surgical needs.  I would recommend he continue his follow-up with GI for IBS and a follow-up CT in a few months to make sure the lymphadenopathy resolves.

## 2024-07-27 LAB — HOLD SPECIMEN: NORMAL

## 2024-08-16 ENCOUNTER — APPOINTMENT (OUTPATIENT)
Dept: PRIMARY CARE | Facility: CLINIC | Age: 34
End: 2024-08-16
Payer: COMMERCIAL

## 2024-08-16 VITALS
WEIGHT: 222 LBS | RESPIRATION RATE: 16 BRPM | TEMPERATURE: 97.6 F | DIASTOLIC BLOOD PRESSURE: 84 MMHG | BODY MASS INDEX: 31.08 KG/M2 | OXYGEN SATURATION: 98 % | HEIGHT: 71 IN | SYSTOLIC BLOOD PRESSURE: 106 MMHG | HEART RATE: 85 BPM

## 2024-08-16 DIAGNOSIS — N20.0 KIDNEY STONE: ICD-10-CM

## 2024-08-16 DIAGNOSIS — I88.9 LYMPHADENITIS: ICD-10-CM

## 2024-08-16 DIAGNOSIS — R10.31 RIGHT LOWER QUADRANT ABDOMINAL PAIN: ICD-10-CM

## 2024-08-16 DIAGNOSIS — N13.4 HYDROURETER: Primary | ICD-10-CM

## 2024-08-16 PROCEDURE — 3008F BODY MASS INDEX DOCD: CPT | Performed by: FAMILY MEDICINE

## 2024-08-16 PROCEDURE — 1036F TOBACCO NON-USER: CPT | Performed by: FAMILY MEDICINE

## 2024-08-16 PROCEDURE — 99214 OFFICE O/P EST MOD 30 MIN: CPT | Performed by: FAMILY MEDICINE

## 2024-08-16 ASSESSMENT — ANXIETY QUESTIONNAIRES
1. FEELING NERVOUS, ANXIOUS, OR ON EDGE: NOT AT ALL
7. FEELING AFRAID AS IF SOMETHING AWFUL MIGHT HAPPEN: NOT AT ALL
6. BECOMING EASILY ANNOYED OR IRRITABLE: NOT AT ALL
IF YOU CHECKED OFF ANY PROBLEMS ON THIS QUESTIONNAIRE, HOW DIFFICULT HAVE THESE PROBLEMS MADE IT FOR YOU TO DO YOUR WORK, TAKE CARE OF THINGS AT HOME, OR GET ALONG WITH OTHER PEOPLE: NOT DIFFICULT AT ALL
4. TROUBLE RELAXING: NOT AT ALL
3. WORRYING TOO MUCH ABOUT DIFFERENT THINGS: NOT AT ALL
5. BEING SO RESTLESS THAT IT IS HARD TO SIT STILL: NOT AT ALL
GAD7 TOTAL SCORE: 0
2. NOT BEING ABLE TO STOP OR CONTROL WORRYING: NOT AT ALL

## 2024-08-16 ASSESSMENT — PATIENT HEALTH QUESTIONNAIRE - PHQ9
1. LITTLE INTEREST OR PLEASURE IN DOING THINGS: NOT AT ALL
2. FEELING DOWN, DEPRESSED OR HOPELESS: NOT AT ALL
SUM OF ALL RESPONSES TO PHQ9 QUESTIONS 1 & 2: 0

## 2024-08-16 ASSESSMENT — ENCOUNTER SYMPTOMS
OCCASIONAL FEELINGS OF UNSTEADINESS: 0
LOSS OF SENSATION IN FEET: 0
DEPRESSION: 0

## 2024-08-16 NOTE — PROGRESS NOTES
"Subjective   Patient ID: Sukhdeep Byrd is a 33 y.o. male who presents for Hospital Follow-up.  HPI  Patient presenting today for hospital follow up.     Patient was seen in [facility] on 7/26/24 for RLQ abd pain. Patient CT abd/pel scan showed a large right mesenteric lymph nodes concerning for lymphadenitis. Advised to follow up with GI .     Patient was not started on any medications.    Since discharge, patient is feeling better. He states that his stomach is still sore but not a constant ache any more. Certain movements agitate his abdominal area.         Taking current medications which were reviewed.  Problem list discussed.    Overall doing well.  Eating okay.  Staying active.    Has no other new problem /question.     ROS  Constitutional- No activity change. No appetite change.  Eyes- Denies vision changes.  Respiratory- No shortness of breath.  Cardiovascular- No palpitations. No chest pain.  GI- No nausea or vomiting. No diarrhea or constipation. Denies abdominal pain.  Musculoskeletal- Denies joint swelling.  Extremities- No edema.  Neurological- Denies headaches. Denies dizziness.  Skin- No rashes.  Psychiatric/Behavioral- Denies significant anxiety, or depressed mood.     Objective     /84   Pulse 85   Temp 36.4 °C (97.6 °F)   Resp 16   Ht 1.803 m (5' 11\")   Wt 101 kg (222 lb)   SpO2 98%   BMI 30.96 kg/m²     Allergies   Allergen Reactions    Iodides Unknown    Shellfish Derived Rash     Nausea    Vancomycin Rash       Constitutional-- Well-nourished.  No distress  Head- unremarkable.  Eyes- PERRL.  Conjunctiva normal.  Nose- Normal.  No rhinorrhea noted.  Throat- Oropharynx is clear and moist.  Neck- Supple with no thyromegaly.  No significant cervical adenopathy noted.  Pulmonary/Chest- Breath sounds normal with normal effort.  No wheezing.  Heart- Regular rate and rhythm.  No murmur.  Abdomen- Soft and non-tender.  No masses noted.  Musculoskeletal- Normal ROM.  No significant joint " swelling  Extremities- No edema.   Neurological- Alert.  No noted deficits.  Skin- Warm.  No rashes.  Psychiatric/Behavioral- Mood and affect normal.  Behavior normal.     Assessment/Plan   1. Hydroureter  Referral to Urology    CT abdomen pelvis wo IV contrast      2. Lymphadenitis  CT abdomen pelvis wo IV contrast      3. Kidney stone  Referral to Urology      4. Right lower quadrant abdominal pain  CT abdomen pelvis wo IV contrast             Long talk. Treatment options reviewed.  Spent 30 minutes with the patient, with at least 1/2 of the time spent in counseling and instruction.    CT reviewed with him in detail.  Discussed hydroureter and history of kidney stones.  Will refer to urologist for opinion.    Since abdominal symptoms have resolved we will arrange for repeat CT scan in 2 months.  Understands if symptoms recur in any way he was to let us know  Continue and take your medications as prescribed.    Health Maintenance issues discussed.  Labs from hospital reviewed  Importance of healthy diet and regular exercise regimen discussed.    We will contact you with any test results ordered. If you do not hear from us, please contact.    Further workup and treatment depending on how he does and test results  Follow-up as instructed or sooner if any problems or symptoms do not resolve as expected.

## 2024-09-18 ENCOUNTER — APPOINTMENT (OUTPATIENT)
Dept: PRIMARY CARE | Facility: CLINIC | Age: 34
End: 2024-09-18
Payer: COMMERCIAL

## 2024-09-18 VITALS
RESPIRATION RATE: 16 BRPM | DIASTOLIC BLOOD PRESSURE: 76 MMHG | SYSTOLIC BLOOD PRESSURE: 118 MMHG | TEMPERATURE: 98 F | BODY MASS INDEX: 30.55 KG/M2 | OXYGEN SATURATION: 97 % | HEART RATE: 84 BPM | HEIGHT: 71 IN | WEIGHT: 218.2 LBS

## 2024-09-18 DIAGNOSIS — F32.A MILD DEPRESSION: ICD-10-CM

## 2024-09-18 DIAGNOSIS — N13.4 HYDROURETER: Primary | ICD-10-CM

## 2024-09-18 DIAGNOSIS — M54.17 LUMBOSACRAL RADICULOPATHY: ICD-10-CM

## 2024-09-18 DIAGNOSIS — K58.9 IRRITABLE BOWEL SYNDROME, UNSPECIFIED TYPE: ICD-10-CM

## 2024-09-18 PROCEDURE — 99395 PREV VISIT EST AGE 18-39: CPT | Performed by: FAMILY MEDICINE

## 2024-09-18 PROCEDURE — 3008F BODY MASS INDEX DOCD: CPT | Performed by: FAMILY MEDICINE

## 2024-09-18 RX ORDER — HYDROCODONE BITARTRATE AND ACETAMINOPHEN 5; 325 MG/1; MG/1
1 TABLET ORAL EVERY 6 HOURS PRN
Qty: 12 TABLET | Refills: 0 | Status: SHIPPED | OUTPATIENT
Start: 2024-09-18 | End: 2024-09-25 | Stop reason: HOSPADM

## 2024-09-18 RX ORDER — CIPROFLOXACIN 250 MG/1
250 TABLET, FILM COATED ORAL 2 TIMES DAILY
Qty: 20 TABLET | Refills: 0 | Status: SHIPPED | OUTPATIENT
Start: 2024-09-18 | End: 2024-09-28

## 2024-09-18 RX ORDER — BUPROPION HYDROCHLORIDE 300 MG/1
300 TABLET ORAL EVERY MORNING
Qty: 90 TABLET | Refills: 1 | Status: SHIPPED | OUTPATIENT
Start: 2024-09-18

## 2024-09-18 ASSESSMENT — ANXIETY QUESTIONNAIRES
GAD7 TOTAL SCORE: 5
2. NOT BEING ABLE TO STOP OR CONTROL WORRYING: SEVERAL DAYS
3. WORRYING TOO MUCH ABOUT DIFFERENT THINGS: SEVERAL DAYS
6. BECOMING EASILY ANNOYED OR IRRITABLE: NOT AT ALL
5. BEING SO RESTLESS THAT IT IS HARD TO SIT STILL: SEVERAL DAYS
1. FEELING NERVOUS, ANXIOUS, OR ON EDGE: SEVERAL DAYS
IF YOU CHECKED OFF ANY PROBLEMS ON THIS QUESTIONNAIRE, HOW DIFFICULT HAVE THESE PROBLEMS MADE IT FOR YOU TO DO YOUR WORK, TAKE CARE OF THINGS AT HOME, OR GET ALONG WITH OTHER PEOPLE: SOMEWHAT DIFFICULT
4. TROUBLE RELAXING: SEVERAL DAYS
7. FEELING AFRAID AS IF SOMETHING AWFUL MIGHT HAPPEN: NOT AT ALL

## 2024-09-18 ASSESSMENT — ENCOUNTER SYMPTOMS
DEPRESSION: 0
OCCASIONAL FEELINGS OF UNSTEADINESS: 0
LOSS OF SENSATION IN FEET: 0

## 2024-09-18 NOTE — PROGRESS NOTES
"Subjective   Patient ID: Sukhdeep Byrd is a 34 y.o. male who presents for Abdominal Pain and Annual Exam.  HPI    Patient presents in office today for an Annual physical. Last eye exam was 2024, last dental exam was March 2024. No new family h/o of cancer or heart disease. Does not need paperwork filled out today.      Patient states that the pain in his abdomen has gotten worse. He has an appointment with urology in October. Patient states that the pain is now wrapping around to his back.  Past scans and test results reviewed with him.    Taking current medications which were reviewed.  Problem list discussed.    Eating okay.  Staying active.    Has no other new problem /question.     ROS  Constitutional- No activity change. No appetite change.  Eyes- Denies vision changes.  Respiratory- No shortness of breath.  Cardiovascular- No palpitations. No chest pain.  GI- abdominal pain.  Musculoskeletal- Denies joint swelling.  Extremities- No edema.  Neurological- Denies headaches. Denies dizziness.  Skin- No rashes.  Psychiatric/Behavioral- Denies significant anxiety, or depressed mood.     Objective     /76   Pulse 84   Temp 36.7 °C (98 °F)   Resp 16   Ht 1.803 m (5' 11\")   Wt 99 kg (218 lb 3.2 oz)   SpO2 97%   BMI 30.43 kg/m²     Allergies   Allergen Reactions    Iodides Unknown    Shellfish Derived Rash and Nausea/vomiting    Vancomycin Rash       Constitutional-- Well-nourished.  No distress  Head- unremarkable.  Eyes- PERRL.  Conjunctiva normal.  Nose- Normal.  No rhinorrhea noted.  Throat- Oropharynx is clear and moist.  Neck- Supple with no thyromegaly.  No significant cervical adenopathy noted.  Pulmonary/Chest- Breath sounds normal with normal effort.  No wheezing.  Heart- Regular rate and rhythm.  No murmur.  Abdomen- Soft and non-tender.  No masses noted.  Musculoskeletal- Normal ROM.  No significant joint swelling.  No localizing point tenderness.  No organomegaly.  Bowel sounds " positive  Extremities- No edema.   Neurological- Alert.  No noted deficits.  Skin- Warm.  No rashes.  Psychiatric/Behavioral- Mood and affect normal.  Behavior normal.     Assessment/Plan   1. Hydroureter  ciprofloxacin (Cipro) 250 mg tablet    DISCONTINUED: HYDROcodone-acetaminophen (Norco) 5-325 mg tablet      2. Mild depression  buPROPion XL (Wellbutrin XL) 300 mg 24 hr tablet      3. Irritable bowel syndrome, unspecified type        4. Lumbosacral radiculopathy  DISCONTINUED: HYDROcodone-acetaminophen (Norco) 5-325 mg tablet             Long talk. Treatment options reviewed.    Reviewed most recent lab work with patient. Advised patient to remain up to date on routine maintenance and health screening.  Maintain appointments with specialists as scheduled.  Advised patient to remain up to date on immunizations.     Discussed hydroureter.  Take Cipro as discussed. Continue to monitor.     Discussed lumbosacral radiculopathy. Take Norco as discussed. Patient understands to use the least amount of medication to control symptoms.     Counseled the patient on depression.     Discussed depression.  Take Bupropion as discussed.     Counseled the patient on depression.     Continue and take your medications as prescribed.    Health Maintenance issues discussed.    Importance of healthy diet and regular exercise regimen discussed.    We will contact you with any test results ordered. If you do not hear from us, please contact.    Follow-up as instructed or sooner if any problems or symptoms do not resolve as expected.         Scribe Attestation  By signing my name below, Kerrie ARTHUR Scribe   attest that this documentation has been prepared under the direction and in the presence of Naseem Aguila MD.

## 2024-09-20 ENCOUNTER — TELEPHONE (OUTPATIENT)
Dept: PRIMARY CARE | Facility: CLINIC | Age: 34
End: 2024-09-20
Payer: COMMERCIAL

## 2024-09-20 NOTE — TELEPHONE ENCOUNTER
SAW DR. AVELAR ON 9/18/24 - HAD AN APPOINTMENT WITH A  UROLOGIST ON 10/8/24 - WANTED HIM TO BE SEEN SOONER.    [facility]ED [facility]    HE WAS SCHEDULED FOR AN APPOINTMENT WITH DR. RYDER ON:    9/26/24 @ 3:45 PM  [address] ARM[address]O [address]  [address]      IF NEEDS TO CANCEL OR RESCHEDULE HE CAN CALL [phone]    [facility] TO RETURN CALL.

## 2024-09-24 ENCOUNTER — TELEPHONE (OUTPATIENT)
Dept: PRIMARY CARE | Facility: CLINIC | Age: 34
End: 2024-09-24
Payer: COMMERCIAL

## 2024-09-24 ENCOUNTER — APPOINTMENT (OUTPATIENT)
Dept: RADIOLOGY | Facility: HOSPITAL | Age: 34
DRG: 690 | End: 2024-09-24
Payer: COMMERCIAL

## 2024-09-24 ENCOUNTER — HOSPITAL ENCOUNTER (INPATIENT)
Facility: HOSPITAL | Age: 34
LOS: 1 days | Discharge: HOME | DRG: 690 | End: 2024-09-25
Attending: EMERGENCY MEDICINE | Admitting: INTERNAL MEDICINE
Payer: COMMERCIAL

## 2024-09-24 DIAGNOSIS — R10.13 EPIGASTRIC PAIN: ICD-10-CM

## 2024-09-24 DIAGNOSIS — K63.89 PNEUMATOSIS OF INTESTINES: Primary | ICD-10-CM

## 2024-09-24 PROBLEM — R10.9 ABDOMINAL PAIN: Status: ACTIVE | Noted: 2024-09-24

## 2024-09-24 LAB
ALBUMIN SERPL BCP-MCNC: 4.6 G/DL (ref 3.4–5)
ALP SERPL-CCNC: 54 U/L (ref 33–120)
ALT SERPL W P-5'-P-CCNC: 21 U/L (ref 10–52)
ANION GAP SERPL CALC-SCNC: 13 MMOL/L (ref 10–20)
APPEARANCE UR: CLEAR
AST SERPL W P-5'-P-CCNC: 15 U/L (ref 9–39)
BASOPHILS # BLD AUTO: 0.03 X10*3/UL (ref 0–0.1)
BASOPHILS NFR BLD AUTO: 0.4 %
BILIRUB SERPL-MCNC: 1 MG/DL (ref 0–1.2)
BILIRUB UR STRIP.AUTO-MCNC: NEGATIVE MG/DL
BUN SERPL-MCNC: 14 MG/DL (ref 6–23)
CALCIUM SERPL-MCNC: 9.5 MG/DL (ref 8.6–10.3)
CHLORIDE SERPL-SCNC: 105 MMOL/L (ref 98–107)
CO2 SERPL-SCNC: 29 MMOL/L (ref 21–32)
COLOR UR: YELLOW
CREAT SERPL-MCNC: 0.9 MG/DL (ref 0.5–1.3)
EGFRCR SERPLBLD CKD-EPI 2021: >90 ML/MIN/1.73M*2
EOSINOPHIL # BLD AUTO: 0.19 X10*3/UL (ref 0–0.7)
EOSINOPHIL NFR BLD AUTO: 2.8 %
ERYTHROCYTE [DISTWIDTH] IN BLOOD BY AUTOMATED COUNT: 12.1 % (ref 11.5–14.5)
GLUCOSE SERPL-MCNC: 92 MG/DL (ref 74–99)
GLUCOSE UR STRIP.AUTO-MCNC: NORMAL MG/DL
HCT VFR BLD AUTO: 42.4 % (ref 41–52)
HGB BLD-MCNC: 14.3 G/DL (ref 13.5–17.5)
HOLD SPECIMEN: NORMAL
IMM GRANULOCYTES # BLD AUTO: 0.02 X10*3/UL (ref 0–0.7)
IMM GRANULOCYTES NFR BLD AUTO: 0.3 % (ref 0–0.9)
KETONES UR STRIP.AUTO-MCNC: NEGATIVE MG/DL
LACTATE SERPL-SCNC: 0.6 MMOL/L (ref 0.4–2)
LEUKOCYTE ESTERASE UR QL STRIP.AUTO: NEGATIVE
LYMPHOCYTES # BLD AUTO: 1.83 X10*3/UL (ref 1.2–4.8)
LYMPHOCYTES NFR BLD AUTO: 27 %
MCH RBC QN AUTO: 29.8 PG (ref 26–34)
MCHC RBC AUTO-ENTMCNC: 33.7 G/DL (ref 32–36)
MCV RBC AUTO: 88 FL (ref 80–100)
MONOCYTES # BLD AUTO: 0.44 X10*3/UL (ref 0.1–1)
MONOCYTES NFR BLD AUTO: 6.5 %
NEUTROPHILS # BLD AUTO: 4.26 X10*3/UL (ref 1.2–7.7)
NEUTROPHILS NFR BLD AUTO: 63 %
NITRITE UR QL STRIP.AUTO: NEGATIVE
NRBC BLD-RTO: 0 /100 WBCS (ref 0–0)
PH UR STRIP.AUTO: 6 [PH]
PLATELET # BLD AUTO: 190 X10*3/UL (ref 150–450)
POTASSIUM SERPL-SCNC: 4.6 MMOL/L (ref 3.5–5.3)
PROT SERPL-MCNC: 7.1 G/DL (ref 6.4–8.2)
PROT UR STRIP.AUTO-MCNC: NEGATIVE MG/DL
RBC # BLD AUTO: 4.8 X10*6/UL (ref 4.5–5.9)
RBC # UR STRIP.AUTO: NEGATIVE /UL
SODIUM SERPL-SCNC: 142 MMOL/L (ref 136–145)
SP GR UR STRIP.AUTO: 1.02
UROBILINOGEN UR STRIP.AUTO-MCNC: NORMAL MG/DL
WBC # BLD AUTO: 6.8 X10*3/UL (ref 4.4–11.3)

## 2024-09-24 PROCEDURE — 99222 1ST HOSP IP/OBS MODERATE 55: CPT | Performed by: SURGERY

## 2024-09-24 PROCEDURE — 99223 1ST HOSP IP/OBS HIGH 75: CPT | Performed by: INTERNAL MEDICINE

## 2024-09-24 PROCEDURE — 83605 ASSAY OF LACTIC ACID: CPT | Performed by: EMERGENCY MEDICINE

## 2024-09-24 PROCEDURE — 96374 THER/PROPH/DIAG INJ IV PUSH: CPT

## 2024-09-24 PROCEDURE — 36415 COLL VENOUS BLD VENIPUNCTURE: CPT | Performed by: EMERGENCY MEDICINE

## 2024-09-24 PROCEDURE — 96375 TX/PRO/DX INJ NEW DRUG ADDON: CPT

## 2024-09-24 PROCEDURE — 99285 EMERGENCY DEPT VISIT HI MDM: CPT | Mod: 25

## 2024-09-24 PROCEDURE — 2550000001 HC RX 255 CONTRASTS: Performed by: EMERGENCY MEDICINE

## 2024-09-24 PROCEDURE — 74177 CT ABD & PELVIS W/CONTRAST: CPT

## 2024-09-24 PROCEDURE — 2500000004 HC RX 250 GENERAL PHARMACY W/ HCPCS (ALT 636 FOR OP/ED)

## 2024-09-24 PROCEDURE — 99285 EMERGENCY DEPT VISIT HI MDM: CPT | Performed by: EMERGENCY MEDICINE

## 2024-09-24 PROCEDURE — 81003 URINALYSIS AUTO W/O SCOPE: CPT | Performed by: EMERGENCY MEDICINE

## 2024-09-24 PROCEDURE — 1100000001 HC PRIVATE ROOM DAILY

## 2024-09-24 PROCEDURE — 80053 COMPREHEN METABOLIC PANEL: CPT | Performed by: EMERGENCY MEDICINE

## 2024-09-24 PROCEDURE — 85025 COMPLETE CBC W/AUTO DIFF WBC: CPT | Performed by: EMERGENCY MEDICINE

## 2024-09-24 PROCEDURE — 96376 TX/PRO/DX INJ SAME DRUG ADON: CPT

## 2024-09-24 PROCEDURE — 2500000005 HC RX 250 GENERAL PHARMACY W/O HCPCS

## 2024-09-24 PROCEDURE — 93976 VASCULAR STUDY: CPT | Performed by: RADIOLOGY

## 2024-09-24 PROCEDURE — 2500000004 HC RX 250 GENERAL PHARMACY W/ HCPCS (ALT 636 FOR OP/ED): Performed by: INTERNAL MEDICINE

## 2024-09-24 PROCEDURE — 93975 VASCULAR STUDY: CPT

## 2024-09-24 PROCEDURE — 74177 CT ABD & PELVIS W/CONTRAST: CPT | Performed by: RADIOLOGY

## 2024-09-24 PROCEDURE — 76870 US EXAM SCROTUM: CPT | Performed by: RADIOLOGY

## 2024-09-24 RX ORDER — ONDANSETRON HYDROCHLORIDE 2 MG/ML
4 INJECTION, SOLUTION INTRAVENOUS ONCE
Status: COMPLETED | OUTPATIENT
Start: 2024-09-24 | End: 2024-09-24

## 2024-09-24 RX ORDER — ONDANSETRON HYDROCHLORIDE 2 MG/ML
4 INJECTION, SOLUTION INTRAVENOUS EVERY 8 HOURS PRN
Status: DISCONTINUED | OUTPATIENT
Start: 2024-09-24 | End: 2024-09-25 | Stop reason: HOSPADM

## 2024-09-24 RX ORDER — MORPHINE SULFATE 4 MG/ML
4 INJECTION, SOLUTION INTRAMUSCULAR; INTRAVENOUS ONCE
Status: COMPLETED | OUTPATIENT
Start: 2024-09-24 | End: 2024-09-24

## 2024-09-24 RX ORDER — ACETAMINOPHEN 160 MG/5ML
650 SOLUTION ORAL EVERY 4 HOURS PRN
Status: DISCONTINUED | OUTPATIENT
Start: 2024-09-24 | End: 2024-09-25 | Stop reason: HOSPADM

## 2024-09-24 RX ORDER — ACETAMINOPHEN 325 MG/1
650 TABLET ORAL EVERY 4 HOURS PRN
Status: DISCONTINUED | OUTPATIENT
Start: 2024-09-24 | End: 2024-09-25 | Stop reason: HOSPADM

## 2024-09-24 RX ORDER — SODIUM CHLORIDE 9 MG/ML
100 INJECTION, SOLUTION INTRAVENOUS CONTINUOUS
Status: DISCONTINUED | OUTPATIENT
Start: 2024-09-24 | End: 2024-09-25

## 2024-09-24 RX ORDER — POLYETHYLENE GLYCOL 3350 17 G/17G
17 POWDER, FOR SOLUTION ORAL DAILY
Status: DISCONTINUED | OUTPATIENT
Start: 2024-09-24 | End: 2024-09-25 | Stop reason: HOSPADM

## 2024-09-24 RX ORDER — BUPROPION HYDROCHLORIDE 150 MG/1
300 TABLET ORAL EVERY MORNING
Status: DISCONTINUED | OUTPATIENT
Start: 2024-09-25 | End: 2024-09-25 | Stop reason: HOSPADM

## 2024-09-24 RX ORDER — ONDANSETRON 4 MG/1
4 TABLET, ORALLY DISINTEGRATING ORAL EVERY 8 HOURS PRN
Status: DISCONTINUED | OUTPATIENT
Start: 2024-09-24 | End: 2024-09-25 | Stop reason: HOSPADM

## 2024-09-24 RX ORDER — ACETAMINOPHEN 650 MG/1
650 SUPPOSITORY RECTAL EVERY 4 HOURS PRN
Status: DISCONTINUED | OUTPATIENT
Start: 2024-09-24 | End: 2024-09-25 | Stop reason: HOSPADM

## 2024-09-24 RX ORDER — MORPHINE SULFATE 2 MG/ML
2 INJECTION, SOLUTION INTRAMUSCULAR; INTRAVENOUS EVERY 4 HOURS PRN
Status: DISCONTINUED | OUTPATIENT
Start: 2024-09-24 | End: 2024-09-24

## 2024-09-24 SDOH — SOCIAL STABILITY: SOCIAL INSECURITY: HAS ANYONE EVER THREATENED TO HURT YOUR FAMILY OR YOUR PETS?: NO

## 2024-09-24 SDOH — SOCIAL STABILITY: SOCIAL INSECURITY: WERE YOU ABLE TO COMPLETE ALL THE BEHAVIORAL HEALTH SCREENINGS?: YES

## 2024-09-24 SDOH — SOCIAL STABILITY: SOCIAL INSECURITY: DO YOU FEEL ANYONE HAS EXPLOITED OR TAKEN ADVANTAGE OF YOU FINANCIALLY OR OF YOUR PERSONAL PROPERTY?: NO

## 2024-09-24 SDOH — SOCIAL STABILITY: SOCIAL INSECURITY: ABUSE: ADULT

## 2024-09-24 SDOH — SOCIAL STABILITY: SOCIAL INSECURITY: ARE THERE ANY APPARENT SIGNS OF INJURIES/BEHAVIORS THAT COULD BE RELATED TO ABUSE/NEGLECT?: NO

## 2024-09-24 SDOH — SOCIAL STABILITY: SOCIAL INSECURITY: DO YOU FEEL UNSAFE GOING BACK TO THE PLACE WHERE YOU ARE LIVING?: NO

## 2024-09-24 SDOH — SOCIAL STABILITY: SOCIAL INSECURITY: ARE YOU OR HAVE YOU BEEN THREATENED OR ABUSED PHYSICALLY, EMOTIONALLY, OR SEXUALLY BY ANYONE?: NO

## 2024-09-24 SDOH — SOCIAL STABILITY: SOCIAL INSECURITY: HAVE YOU HAD ANY THOUGHTS OF HARMING ANYONE ELSE?: NO

## 2024-09-24 SDOH — SOCIAL STABILITY: SOCIAL INSECURITY: DOES ANYONE TRY TO KEEP YOU FROM HAVING/CONTACTING OTHER FRIENDS OR DOING THINGS OUTSIDE YOUR HOME?: NO

## 2024-09-24 SDOH — SOCIAL STABILITY: SOCIAL INSECURITY: HAVE YOU HAD THOUGHTS OF HARMING ANYONE ELSE?: YES

## 2024-09-24 ASSESSMENT — COGNITIVE AND FUNCTIONAL STATUS - GENERAL
PATIENT BASELINE BEDBOUND: NO
MOBILITY SCORE: 24
DAILY ACTIVITIY SCORE: 24
DAILY ACTIVITIY SCORE: 24
MOBILITY SCORE: 24

## 2024-09-24 ASSESSMENT — ACTIVITIES OF DAILY LIVING (ADL)
ADEQUATE_TO_COMPLETE_ADL: YES
ADEQUATE_TO_COMPLETE_ADL: YES
GROOMING: INDEPENDENT
HEARING - LEFT EAR: FUNCTIONAL
PATIENT'S MEMORY ADEQUATE TO SAFELY COMPLETE DAILY ACTIVITIES?: YES
BATHING: INDEPENDENT
HEARING - RIGHT EAR: FUNCTIONAL
HEARING - RIGHT EAR: FUNCTIONAL
GROOMING: INDEPENDENT
WALKS IN HOME: INDEPENDENT
DRESSING YOURSELF: INDEPENDENT
BATHING: INDEPENDENT
TOILETING: INDEPENDENT
TOILETING: INDEPENDENT
FEEDING YOURSELF: INDEPENDENT
LACK_OF_TRANSPORTATION: PATIENT DECLINED
PATIENT'S MEMORY ADEQUATE TO SAFELY COMPLETE DAILY ACTIVITIES?: YES
JUDGMENT_ADEQUATE_SAFELY_COMPLETE_DAILY_ACTIVITIES: YES
FEEDING YOURSELF: INDEPENDENT
WALKS IN HOME: INDEPENDENT
DRESSING YOURSELF: INDEPENDENT
JUDGMENT_ADEQUATE_SAFELY_COMPLETE_DAILY_ACTIVITIES: YES

## 2024-09-24 ASSESSMENT — ENCOUNTER SYMPTOMS
EYES NEGATIVE: 1
VOMITING: 0
HEMATOLOGIC/LYMPHATIC NEGATIVE: 1
CONSTITUTIONAL NEGATIVE: 1
ABDOMINAL PAIN: 1
NEUROLOGICAL NEGATIVE: 1
ALLERGIC/IMMUNOLOGIC NEGATIVE: 1
ANAL BLEEDING: 0
NAUSEA: 0
DIARRHEA: 0
RESPIRATORY NEGATIVE: 1
CARDIOVASCULAR NEGATIVE: 1
FREQUENCY: 1
ENDOCRINE NEGATIVE: 1
BACK PAIN: 1
ABDOMINAL DISTENTION: 0
FLANK PAIN: 1
RECTAL PAIN: 0
PSYCHIATRIC NEGATIVE: 1
CONSTIPATION: 0
BLOOD IN STOOL: 0

## 2024-09-24 ASSESSMENT — LIFESTYLE VARIABLES
SKIP TO QUESTIONS 9-10: 0
EVER HAD A DRINK FIRST THING IN THE MORNING TO STEADY YOUR NERVES TO GET RID OF A HANGOVER: NO
AUDIT-C TOTAL SCORE: 2
HAVE PEOPLE ANNOYED YOU BY CRITICIZING YOUR DRINKING: NO
HOW OFTEN DO YOU HAVE A DRINK CONTAINING ALCOHOL: MONTHLY OR LESS
TOTAL SCORE: 0
EVER FELT BAD OR GUILTY ABOUT YOUR DRINKING: NO
HAVE YOU EVER FELT YOU SHOULD CUT DOWN ON YOUR DRINKING: NO
HOW MANY STANDARD DRINKS CONTAINING ALCOHOL DO YOU HAVE ON A TYPICAL DAY: 1 OR 2
AUDIT-C TOTAL SCORE: 2
HOW OFTEN DO YOU HAVE 6 OR MORE DRINKS ON ONE OCCASION: LESS THAN MONTHLY

## 2024-09-24 ASSESSMENT — PAIN SCALES - GENERAL
PAINLEVEL_OUTOF10: 8
PAINLEVEL_OUTOF10: 7
PAINLEVEL_OUTOF10: 4
PAINLEVEL_OUTOF10: 9
PAINLEVEL_OUTOF10: 3
PAINLEVEL_OUTOF10: 0 - NO PAIN
PAINLEVEL_OUTOF10: 7
PAINLEVEL_OUTOF10: 8
PAINLEVEL_OUTOF10: 8
PAINLEVEL_OUTOF10: 1

## 2024-09-24 ASSESSMENT — PAIN SCALES - PAIN ASSESSMENT IN ADVANCED DEMENTIA (PAINAD): TOTALSCORE: MEDICATION (SEE MAR);REPOSITIONED

## 2024-09-24 ASSESSMENT — PAIN DESCRIPTION - ORIENTATION
ORIENTATION: LEFT
ORIENTATION: LEFT

## 2024-09-24 ASSESSMENT — PAIN DESCRIPTION - LOCATION
LOCATION: ABDOMEN

## 2024-09-24 ASSESSMENT — PAIN - FUNCTIONAL ASSESSMENT
PAIN_FUNCTIONAL_ASSESSMENT: 0-10

## 2024-09-24 ASSESSMENT — COLUMBIA-SUICIDE SEVERITY RATING SCALE - C-SSRS
1. IN THE PAST MONTH, HAVE YOU WISHED YOU WERE DEAD OR WISHED YOU COULD GO TO SLEEP AND NOT WAKE UP?: NO
6. HAVE YOU EVER DONE ANYTHING, STARTED TO DO ANYTHING, OR PREPARED TO DO ANYTHING TO END YOUR LIFE?: NO
2. HAVE YOU ACTUALLY HAD ANY THOUGHTS OF KILLING YOURSELF?: NO

## 2024-09-24 ASSESSMENT — PAIN DESCRIPTION - PAIN TYPE: TYPE: ACUTE PAIN

## 2024-09-24 NOTE — ED PROVIDER NOTES
EMERGENCY DEPARTMENT ENCOUNTER      Pt Name: Sukhdeep Byrd  MRN: [id number]  Birthdate 1990  Date of evaluation: 9/24/2024    HISTORY OF PRESENT ILLNESS    Sukhdeep Byrd is an 34 y.o. male with history including IBS, MDD, and lumbosacral radiculopathy presenting to the emergency department for left flank and abdominal pain radiating to left testicle.    Patient was seen at [facility] ED on 7/26/2024 for right lower quadrant abdominal pain at which time CT A/P showed a large right mesenteric lymph nodes concerning for lymphadenitis.  He was recommended to follow-up with PCP and GI.  Since his discharge from the ED in late July, patient reports that his right lower quadrant pain has migrated to the left.  The pain is now is constant and unable to be relieved by Motrin.  He notes that the L testicular pain was occasional in late July but is now constant. He continues to experience urinary frequency during the day and night.  He notes that he went to the bathroom 5 times last he is also experiencing nausea.  Dr. Aguila, his PCP, started him on ciprofloxacin 250 twice daily last Tuesday and Norco.  He reports that Norco was able to provide pain relief.  He has a appointment with urology for hydroureter on 9/26. He denies any recent sick contacts, chest pain, palpitations, or dyspnea.    PAST MEDICAL HISTORY     Past Medical History:   Diagnosis Date    Acute upper respiratory infection, unspecified 06/30/2020    Acute URI    Body mass index (BMI) 34.0-34.9, adult 01/17/2022    BMI 34.0-34.9,adult    Cough, unspecified 02/11/2020    Cough in adult    Cough, unspecified 04/07/2020    Cough in adult    Encounter for follow-up examination after completed treatment for conditions other than malignant neoplasm 08/12/2016    Postoperative follow-up    Impacted cerumen, unspecified ear 02/11/2020    Wax in ear    Nausea 08/24/2020    Moderate nausea    Other acute postprocedural pain 08/12/2016    Postoperative pain    Other  conditions influencing health status 04/07/2020    Testicular/scrotal pain    Other obesity due to excess calories 01/17/2022    Class 1 obesity due to excess calories without serious comorbidity with body mass index (BMI) of 34.0 to 34.9 in adult    Other specified diseases of gallbladder 12/16/2015    Biliary dyskinesia    Pain in right leg 06/15/2020    Pain of right lower extremity    Pain in right wrist 03/10/2020    Right wrist pain    Personal history of other diseases of the digestive system 12/16/2015    History of hiatal hernia    Personal history of other diseases of the digestive system 12/16/2015    History of chronic cholecystitis    Personal history of other diseases of the musculoskeletal system and connective tissue 07/31/2020    History of tendinitis    Personal history of other diseases of the respiratory system 02/11/2020    History of upper respiratory infection    Personal history of other diseases of the respiratory system 02/11/2020    History of bronchitis    Personal history of other specified conditions 08/23/2019    History of diarrhea    Personal history of other specified conditions 08/23/2019    History of epigastric pain    Vomiting, unspecified 03/10/2020    Vomiting alone       SURGICAL HISTORY       Past Surgical History:   Procedure Laterality Date    ABDOMINAL ADHESION SURGERY  08/17/2016    Laparoscopic Lysis Of Intestinal Adhesions    CHOLECYSTECTOMY  11/30/2015    Cholecystectomy Laparoscopic       CURRENT MEDICATIONS       Previous Medications    BUPROPION XL (WELLBUTRIN XL) 300 MG 24 HR TABLET    Take 1 tablet (300 mg) by mouth once daily in the morning. Do not crush, chew, or split.    CIPROFLOXACIN (CIPRO) 250 MG TABLET    Take 1 tablet (250 mg) by mouth 2 times a day for 10 days.    HYDROCORTISONE 2.5 % CREAM    Apply to upper back, eyebrows 2x daily x 2 weeks then 2x daily 2-3 days/wk    METHYLPREDNISOLONE (MEDROL DOSPAK) 4 MG TABLETS    Take as directed on package.     PHENTERMINE (ADIPEX-P) 37.5 MG TABLET    TAKE 1 DAILY EVERY MORNING    SULFACETAMIDE SODIUM-SULFUR (AVAR) 10-5 % (W/W) TOPICAL EMULSION    Lather onto the face, upper back for 1-2 minutes then rinse once daily       ALLERGIES     Iodides, Shellfish derived, and Vancomycin    FAMILY HISTORY     No family history on file.     SOCIAL HISTORY       Social History     Socioeconomic History    Marital status:    Tobacco Use    Smoking status: Never    Smokeless tobacco: Never   Vaping Use    Vaping status: Every Day    Substances: Nicotine   Substance and Sexual Activity    Alcohol use: Never    Drug use: Never       PHYSICAL EXAM       ED Triage Vitals [09/24/24 1103]   Temperature Heart Rate Respirations BP   36.9 °C (98.4 °F) 91 20 (!) 148/97      Pulse Ox Temp Source Heart Rate Source Patient Position   98 % Temporal Monitor Sitting      BP Location FiO2 (%)     Right arm --       Physical Exam  Constitutional:       General: He is not in acute distress.     Appearance: He is normal weight.   Cardiovascular:      Rate and Rhythm: Normal rate and regular rhythm.      Pulses: Normal pulses.      Heart sounds: No murmur heard.  Pulmonary:      Effort: Pulmonary effort is normal. No respiratory distress.      Breath sounds: Wheezing present.      Comments: Wheezing in upper and lower left lung fields  Abdominal:      General: Bowel sounds are normal. There is no distension.      Tenderness: There is left CVA tenderness. There is no guarding.   Neurological:      Mental Status: He is alert and oriented to person, place, and time. Mental status is at baseline.   Psychiatric:         Mood and Affect: Mood normal.         Behavior: Behavior normal.         Judgment: Judgment normal.          DIAGNOSTIC RESULTS     LABS:  Labs Reviewed   URINALYSIS WITH REFLEX MICROSCOPIC - Normal       Result Value    Color, Urine Yellow      Appearance, Urine Clear      Specific Gravity, Urine 1.024      pH, Urine 6.0      Protein,  Urine NEGATIVE      Glucose, Urine Normal      Blood, Urine NEGATIVE      Ketones, Urine NEGATIVE      Bilirubin, Urine NEGATIVE      Urobilinogen, Urine Normal      Nitrite, Urine NEGATIVE      Leukocyte Esterase, Urine NEGATIVE     CBC WITH AUTO DIFFERENTIAL    WBC 6.8      nRBC 0.0      RBC 4.80      Hemoglobin 14.3      Hematocrit 42.4      MCV 88      MCH 29.8      MCHC 33.7      RDW 12.1      Platelets 190      Neutrophils % 63.0      Immature Granulocytes %, Automated 0.3      Lymphocytes % 27.0      Monocytes % 6.5      Eosinophils % 2.8      Basophils % 0.4      Neutrophils Absolute 4.26      Immature Granulocytes Absolute, Automated 0.02      Lymphocytes Absolute 1.83      Monocytes Absolute 0.44      Eosinophils Absolute 0.19      Basophils Absolute 0.03     COMPREHENSIVE METABOLIC PANEL   URINALYSIS WITH REFLEX CULTURE AND MICROSCOPIC    Narrative:     The following orders were created for panel order Urinalysis with Reflex Culture and Microscopic.  Procedure                               Abnormality         Status                     ---------                               -----------         ------                     Urinalysis with Reflex C...[[id number]]                                                 Extra Urine Gray Tube[[id number]]                                                         Please view results for these tests on the individual orders.   URINALYSIS WITH REFLEX CULTURE AND MICROSCOPIC   EXTRA URINE GRAY TUBE   URINE GRAY TUBE       All other labs were within normal range or not returned as of this dictation.    Imaging  CT abdomen pelvis w IV contrast    (Results Pending)   US scrotum    (Results Pending)        Procedures  Procedures     EMERGENCY DEPARTMENT COURSE/MDM:   Medical Decision Making    This is a 34 y.o. male with history including IBS, MDD, and lumbosacral radiculopathy presenting to the emergency department for left flank and abdominal pain radiating to left testicle.  Patient was seen at [facility] ED on 7/26/2024 for right lower quadrant abdominal pain at which time CT A/P showed a large right mesenteric lymph nodes concerning for lymphadenitis.  Patient is vitally stable.  CBC and CMP are grossly unremarkable.  No leukocytosis.  UA with reflex is negative.  Patient was given 4 mg of Zofran and 4 mg of IV morphine x 2.  CT abdomen pelvis with contrast showed mild left mid abdominal small bowel pneumatosis, raising concern for ischemia. Spoke with surgery, Dr. Barrett, who recommended getting a CT enterography with contrast. CT abd/pelvis otherwise showed no definite urolithiasis and distal left hydroureter and mild left intrarenal collecting system fullness, unchanged from 7/26/2024.  US scrotum and lactate are pending. Was informed by CT that CT enterography has to be obtained tomorrow. Patient will be admitted to medicine under Dr. Romano with general surgery on consult.     External records reviewed: recent inpatient, clinic, and prior ED notes  Labs and Diagnostic imaging independently reviewed/interpreted by me.    Patient plan, care, lab results and imaging were all discussed with attending.    ED Medications administered this visit:    Medications   oxygen (O2) therapy (3 L/min inhalation Start 9/24/24 1200)   ondansetron (Zofran) injection 4 mg (has no administration in time range)   morphine injection 4 mg (has no administration in time range)     New Prescriptions from this visit:    New Prescriptions    No medications on file       (Please note that portions of this note were completed with a voice recognition program.  Efforts were made to edit the dictations but occasionally words are mis-transcribed.)     Jeanne Gonzalez DO  Resident  09/24/24 [id number]

## 2024-09-24 NOTE — CONSULTS
Reason For Consult  Recurrent abdominal pain.    History Of Present Illness  Sukhdeep Byrd is a 34 y.o. male presenting past medical history of lymphadenitis, bowel obstruction, cholecystectomy, multiple back surgeries (lumbar), IBS presented with abdominal pain and left flank pain radiating to the left testicle. Patient was recently seen in the emergency room 07/26/2024 for right lower quadrant abdominal pain at which time CT of the abdomen showed a large right mesenteric lymph nodes concerning for lymphadenitis without other findings.  Patient complains of worsening abdominal  radiating to left flank pain so he came into the emergency room for evaluation. Pain is different from when he had his gallbladder removed and when his IBS discomfort increased. He complains of urinary frequency but is unsure about the volume. He was started on Cipro by his PCP for possible UTI/kidney infection however did not have a urinalysis done at that time. Patient was told to see urology and he has an appointment with Dr. Webber in 2 days on 09/26/24 due to CT imaging showing a hydroureter.     Patient's vitals were stable upon arrival.  His basic labs are unremarkable, lactic acid was negative.  Urinalysis was negative.  Ultrasound of the scrotum with Doppler showed unremarkable bilateral testes, bilateral epididymal head cysts or spermatoceles measuring up to 4 mm on the right.  CT of the abdomen showed no definite renal stones, distal left hydroureter and mild left intrarenal collecting system fullness similar to 07/26/2024.  Patient has suspected trace left mid abdominal small bowel pneumatosis, no associated inflammatory changes or bowel obstructions.  Patient has mild bladder wall thickening which could be related to cystitis. I reviewed the imaging and don't see any inflammation whatsoever adjacent to this sliver of air and believe it to be intraluminal. I recommended to the ED to get enterography to confirm.     He denies  chest pain, shortness of breath, vomiting or diarrhea. Does not believe the pain is food related. It wakes him from sleep.     Past Medical History  He has a past medical history of Acute upper respiratory infection, unspecified (06/30/2020), Body mass index (BMI) 34.0-34.9, adult (01/17/2022), Cough, unspecified (02/11/2020), Cough, unspecified (04/07/2020), Encounter for follow-up examination after completed treatment for conditions other than malignant neoplasm (08/12/2016), Impacted cerumen, unspecified ear (02/11/2020), Nausea (08/24/2020), Other acute postprocedural pain (08/12/2016), Other conditions influencing health status (04/07/2020), Other obesity due to excess calories (01/17/2022), Other specified diseases of gallbladder (12/16/2015), Pain in right leg (06/15/2020), Pain in right wrist (03/10/2020), Personal history of other diseases of the digestive system (12/16/2015), Personal history of other diseases of the digestive system (12/16/2015), Personal history of other diseases of the musculoskeletal system and connective tissue (07/31/2020), Personal history of other diseases of the respiratory system (02/11/2020), Personal history of other diseases of the respiratory system (02/11/2020), Personal history of other specified conditions (08/23/2019), Personal history of other specified conditions (08/23/2019), and Vomiting, unspecified (03/10/2020).    Surgical History  He has a past surgical history that includes Cholecystectomy (11/30/2015) and Abdominal adhesion surgery (08/17/2016).     Social History  He reports that he has never smoked. He has never used smokeless tobacco. He reports that he does not drink alcohol and does not use drugs.    Family History  No family history on file.     Allergies  Iodides, Shellfish derived, and Vancomycin    Review of Systems  Review of Systems   Constitutional: Negative.    HENT: Negative.     Eyes: Negative.    Respiratory: Negative.     Cardiovascular:  "Negative.    Gastrointestinal:  Positive for abdominal pain. Negative for abdominal distention, anal bleeding, blood in stool, constipation, diarrhea, nausea, rectal pain and vomiting.   Endocrine: Negative.    Genitourinary:  Positive for flank pain, frequency, testicular pain and urgency.   Musculoskeletal:  Positive for back pain.   Skin: Negative.    Allergic/Immunologic: Negative.    Neurological: Negative.    Hematological: Negative.    Psychiatric/Behavioral: Negative.            Physical Exam  Physical Exam  Constitutional:       Appearance: Normal appearance.   HENT:      Head: Atraumatic.      Nose: Nose normal.      Mouth/Throat:      Mouth: Mucous membranes are moist.   Cardiovascular:      Rate and Rhythm: Normal rate and regular rhythm.   Pulmonary:      Effort: Pulmonary effort is normal.      Breath sounds: Normal breath sounds.   Abdominal:      General: There is no distension.      Palpations: Abdomen is soft.      Tenderness: There is abdominal tenderness. There is no guarding or rebound.      Comments: LUQ pain.   Skin:     General: Skin is warm.   Neurological:      Mental Status: He is alert. Mental status is at baseline.   Psychiatric:         Mood and Affect: Mood normal.         Thought Content: Thought content normal.         Judgment: Judgment normal.            Last Recorded Vitals  Blood pressure 123/90, pulse 75, temperature 35.8 °C (96.4 °F), temperature source Temporal, resp. rate 18, height 1.803 m (5' 11\"), weight 99.8 kg (220 lb), SpO2 100%.    Relevant Results  US scrotum w doppler    Result Date: 9/24/2024  Interpreted By:  Breana Ralph, STUDY: US SCROTUM WITH DOPPLER;  9/24/2024 3:36 pm   INDICATION:   Signs/Symptoms:L testicular pain, hydroureter.     COMPARISON: None.   ACCESSION NUMBER(S): [id number]   ORDERING CLINICIAN: IVAN VAN   TECHNIQUE: Multiple ultrasonographic images of scrotum and testes were obtained.   FINDINGS: RIGHT HEMISCROTUM:   RIGHT TESTICLE:   " Measures 4.9 x 2.4 x 2.8 cm. Echogenicity within normal limits. No focal lesion demonstrated. Intact flow on color and pulsed Doppler evaluation.   RIGHT EPIDIDYMIS:   4 mm cystic lesion containing a few septations in the head. 2 mm cyst in the anterior head   No significant peritesticular fluid or dilated peritesticular veins demonstrated.     LEFT HEMISCROTUM:   LEFT TESTICLE:   Measures 4.5 x 2.5 by 3.1 cm. Echogenicity within normal limits. No focal lesion demonstrated. Intact flow on color and pulsed Doppler evaluation.   LEFT EPIDIDYMIS:   2 mm cyst in the head.   No significant peritesticular fluid or dilated peritesticular veins demonstrated.       Sonographically unremarkable bilateral testes.   Bilateral epididymal head cysts or spermatoceles measuring up to 4 mm on the right.   MACRO: None.   Signed by: Breana Ralph 9/24/2024 4:09 PM Dictation workstation:   [id number]    CT abdomen pelvis w IV contrast    Result Date: 9/24/2024  Interpreted By:  Breana Ralph, STUDY: CT ABDOMEN PELVIS W IV CONTRAST;  9/24/2024 1:30 pm   INDICATION: Signs/Symptoms:LLQ pain radiating to L testicle.     COMPARISON: 07/26/2024   ACCESSION NUMBER(S): [id number]   ORDERING CLINICIAN: IVAN VAN   TECHNIQUE: CT of the abdomen and pelvis was performed. Sagittal and coronal reconstructions were generated. 90 cc Omnipaque 350 intravenous contrast given for the exam.   FINDINGS:     ABDOMINAL ORGANS:   LIVER: No focal lesion   GALL BLADDER AND BILIARY TREE: Status post cholecystectomy. Mild intrahepatic biliary tree fullness similar to the previous exam.   SPLEEN: No focal lesion   PANCREAS: No focal lesion   ADRENALS: No adrenal mass   KIDNEYS AND URETERS: Slight left intrarenal collecting system fullness and fusiform dilatation of the distal left ureter similar to the previous exam. No renal or ureteral calculus within limits of contrast-enhanced exam. No focal renal mass within limits of nephrogram phase images.   BOWEL:  No abnormally dilated large or small bowel loops. Thin crescentic lucency, possible pneumatosis, in left mid abdominal small bowel loop image 86/217. No surrounding inflammatory changes. Dots of air in nondilated cecal appendix.   PERITONEUM, RETROPERITONEUM, NODES: No significant free fluid. No free air. No significant retroperitoneal lymphadenopathy. Subcentimeter lymph nodes in the right lower quadrant mesentery.   VESSELS:  Abdominal aorta is normal in caliber. Enhancing splenic hilar varices similar to the prior exam.   PELVIS: Urinary bladder is partially distended with mildly thickened wall. No calcified bladder stone. No gross prostate enlargement.   ABDOMINAL WALL: No sizable abdominal wall hernia.   BONES: Ill-defined sclerosis around left-greater-than-right SI joints. Subcentimeter sclerotic focus in the superior left iliac bone image 127/217. Degenerative changes of the lumbar spine. These findings are similar to the prior exam.   LOWER CHEST: No airspace consolidation or pleural effusion in the included areas.       No definite urolithiasis. Distal left hydroureter and mild left intrarenal collecting system fullness similar to 07/26/2024.   Suspected trace left mid abdominal small bowel pneumatosis. No associated inflammatory changes or bowel obstruction. Recommend clinical correlation and follow-up imaging as clinically warranted.   Mild bladder wall thickening could be related to poor distention however trabeculation or cystitis could have a similar appearance.   Additional findings as described above.   MACRO: None.   Signed by: Breana Ralph 9/24/2024 1:43 PM Dictation workstation:   [id number]        Assessment/Plan     35 YO M with more than a month of left flank pain radiating to scrotum and left back. Prior sacral herniation surgery. Prior cholecystectomy. Concern for pneumatosis. Longstanding left hydronephrosis.    Patient does not look like someone with pneumatosis. No cause. CT enterography  will prove he does not have any. Lipase normal last admission, same pain. Lactate normal. Eating and drinking normally. GI consult but assume concordance.  Patient has ongoing mild hydronephrosis, groin pain, and LUQ pain on the same side. He should be promptly evaluated by urology. Anticipate imaging and cystoscopy. Given that he has no GI complaints to associate with the pain and has increased frequency, this is where I would put my efforts in the workup. UA is normal. Please do strict IO tracking.  Surgery will follow in AM but do not anticipate any interventions.  Would make patient NPO at midnight in case GI or Urology wish to perform any procedures, and for enterography. CBC, lactate and CMP with lipase in AM.    I spent 60 minutes in the professional and overall care of this patient.      Scar Barrett MD PhD

## 2024-09-24 NOTE — NURSING NOTE
1728: Pt. Arrived to 3N at this time from ED. Pt. Alert and oriented x4.  Wife at bedside pt Independent in the room. Pt. C/o 8/10 pain to abd and back and nausea. Pt. States he received morphine in the ED and it did not help with pain. This RN reached out to MD for medication for pain. Pt. Also requesting heating pad for pain.    1806: Prn dilaudid and Zofran ordered and given at this time     EOS: Pt. Stable after arrival to floor. Pt. Independent ambulating in room. Pt. Medicated with PRN medications with pt. Reporting decrease in pain. Pt. Tolerating IV fluids at this time. Urinal given to pt. For strict I&os. Call light within reach. Wife at bedside.

## 2024-09-24 NOTE — TELEPHONE ENCOUNTER
Patient is calling because he saw you on 9/18/24 for abdominal pain. He states he feels worse now. His pain in his kidney area on his left side and in his testicles. He feels hot one minute and cold the next. Wondered if he needed to see you again or should he go to the ER?    Please advise  Thanks    Patient's # [phone]

## 2024-09-24 NOTE — TELEPHONE ENCOUNTER
Naseem Aguila MD  Do [id number] Clerical3 minutes ago (9:36 AM)       I would be more than happy to see him today however given his significant symptoms and past history I recommend going to the emergency room as they can do testing and blood work immediately.  Please let him know.  Thanks       Called patient and he is aware

## 2024-09-24 NOTE — H&P
History Of Present Illness  uSkhdeep Byrd is a 34 y.o. male with past medical history of lymphadenitis, bowel obstruction, cholecystectomy, back surgery, IBS presented with abdominal pain and left flank pain radiating to the left testicle.  Patient was recently seen in the emergency room 2 months ago for right lower quadrant abdominal pain at which time CT of the abdomen showed a large right mesenteric lymph nodes concerning for lymphadenitis.  Patient complains of worsening abdominal and left flank pain so he came into the emergency room for evaluation.  He complains of urinary frequency, he was started on Cipro by his PCP for possible UTI/kidney infection however did not have a urinalysis done at that time.  Unfortunately UA will likely be negative now that he has been on antibiotics.  Patient was told to see urology and he has an appointment with Dr. Webber in 2 days on 9-26/24 due to CT imaging showing a hydroureter.    Patient's vitals were stable upon arrival.  His basic labs are unremarkable, lactic acid was negative.  Urinalysis was negative.  Ultrasound of the scrotum with Doppler showed unremarkable bilateral testes, bilateral epididymal head cysts or spermatoceles measuring up to 4 mm on the right.  CT of the abdomen showed no definite renal stones, distal left hydroureter and mild left intrarenal collecting system fullness similar to 7-.  Patient has suspected trace left mid abdominal small bowel pneumatosis, no associated inflammatory changes or bowel obstructions.  Patient has mild bladder wall thickening which could be related to cystitis.    General surgery was contacted by the emergency room due to abnormal CT scan showing small bowel pneumatosis, they recommended CT enterography however this cannot be completed until tomorrow.  Patient will be admitted observation for further evaluation of his abdominal pain and left flank pain.  General surgery, GI and urology are consulted.  Patient  complains of flank pain, left groin pain, chills and nausea.  He denies chest pain, shortness of breath, vomiting or diarrhea.     Past Medical History  He has a past medical history of Acute upper respiratory infection, unspecified (06/30/2020), Body mass index (BMI) 34.0-34.9, adult (01/17/2022), Cough, unspecified (02/11/2020), Cough, unspecified (04/07/2020), Encounter for follow-up examination after completed treatment for conditions other than malignant neoplasm (08/12/2016), Impacted cerumen, unspecified ear (02/11/2020), Nausea (08/24/2020), Other acute postprocedural pain (08/12/2016), Other conditions influencing health status (04/07/2020), Other obesity due to excess calories (01/17/2022), Other specified diseases of gallbladder (12/16/2015), Pain in right leg (06/15/2020), Pain in right wrist (03/10/2020), Personal history of other diseases of the digestive system (12/16/2015), Personal history of other diseases of the digestive system (12/16/2015), Personal history of other diseases of the musculoskeletal system and connective tissue (07/31/2020), Personal history of other diseases of the respiratory system (02/11/2020), Personal history of other diseases of the respiratory system (02/11/2020), Personal history of other specified conditions (08/23/2019), Personal history of other specified conditions (08/23/2019), and Vomiting, unspecified (03/10/2020).    Surgical History  He has a past surgical history that includes Cholecystectomy (11/30/2015) and Abdominal adhesion surgery (08/17/2016).     Social History  He reports that he has never smoked. He has never used smokeless tobacco. He reports that he does not drink alcohol and does not use drugs.    Family History  No family history on file.     Allergies  Iodides, Shellfish derived, and Vancomycin    All 12 systems reviewed and negative besides what stated above HPI     Physical Exam  Vitals reviewed.   Constitutional:       Appearance: Normal  appearance.   HENT:      Head: Normocephalic and atraumatic.      Nose: Nose normal.   Cardiovascular:      Rate and Rhythm: Normal rate and regular rhythm.      Pulses: Normal pulses.      Heart sounds: Normal heart sounds.   Pulmonary:      Effort: Pulmonary effort is normal.      Breath sounds: Normal breath sounds. No rales.   Abdominal:      Comments: Left flank pain, + abd pain   Skin:     General: Skin is warm and dry.   Neurological:      Mental Status: He is alert and oriented to person, place, and time. Mental status is at baseline.   Psychiatric:         Mood and Affect: Mood normal.          Last Recorded Vitals  /88 (BP Location: Left arm, Patient Position: Sitting)   Pulse 91   Temp 36.7 °C (98.1 °F) (Temporal)   Resp 20   Wt 99.8 kg (220 lb)   SpO2 99%     Relevant Results        Scheduled medications  oxygen, , inhalation, Continuous - Inhalation      Continuous medications     PRN medications     Results for orders placed or performed during the hospital encounter of 09/24/24 (from the past 24 hour(s))   Light Blue Top   Result Value Ref Range    Extra Tube Hold for add-ons.    Lavender Top   Result Value Ref Range    Extra Tube Hold for add-ons.    PST Top   Result Value Ref Range    Extra Tube Hold for add-ons.    CBC with Differential   Result Value Ref Range    WBC 6.8 4.4 - 11.3 x10*3/uL    nRBC 0.0 0.0 - 0.0 /100 WBCs    RBC 4.80 4.50 - 5.90 x10*6/uL    Hemoglobin 14.3 13.5 - 17.5 g/dL    Hematocrit 42.4 41.0 - 52.0 %    MCV 88 80 - 100 fL    MCH 29.8 26.0 - 34.0 pg    MCHC 33.7 32.0 - 36.0 g/dL    RDW 12.1 11.5 - 14.5 %    Platelets 190 150 - 450 x10*3/uL    Neutrophils % 63.0 40.0 - 80.0 %    Immature Granulocytes %, Automated 0.3 0.0 - 0.9 %    Lymphocytes % 27.0 13.0 - 44.0 %    Monocytes % 6.5 2.0 - 10.0 %    Eosinophils % 2.8 0.0 - 6.0 %    Basophils % 0.4 0.0 - 2.0 %    Neutrophils Absolute 4.26 1.20 - 7.70 x10*3/uL    Immature Granulocytes Absolute, Automated 0.02 0.00 -  0.70 x10*3/uL    Lymphocytes Absolute 1.83 1.20 - 4.80 x10*3/uL    Monocytes Absolute 0.44 0.10 - 1.00 x10*3/uL    Eosinophils Absolute 0.19 0.00 - 0.70 x10*3/uL    Basophils Absolute 0.03 0.00 - 0.10 x10*3/uL   Comprehensive Metabolic Panel   Result Value Ref Range    Glucose 92 74 - 99 mg/dL    Sodium 142 136 - 145 mmol/L    Potassium 4.6 3.5 - 5.3 mmol/L    Chloride 105 98 - 107 mmol/L    Bicarbonate 29 21 - 32 mmol/L    Anion Gap 13 10 - 20 mmol/L    Urea Nitrogen 14 6 - 23 mg/dL    Creatinine 0.90 0.50 - 1.30 mg/dL    eGFR >90 >60 mL/min/1.73m*2    Calcium 9.5 8.6 - 10.3 mg/dL    Albumin 4.6 3.4 - 5.0 g/dL    Alkaline Phosphatase 54 33 - 120 U/L    Total Protein 7.1 6.4 - 8.2 g/dL    AST 15 9 - 39 U/L    Bilirubin, Total 1.0 0.0 - 1.2 mg/dL    ALT 21 10 - 52 U/L   Urinalysis with Reflex Microscopic   Result Value Ref Range    Color, Urine Yellow Light-Yellow, Yellow, Dark-Yellow    Appearance, Urine Clear Clear    Specific Gravity, Urine 1.024 1.005 - 1.035    pH, Urine 6.0 5.0, 5.5, 6.0, 6.5, 7.0, 7.5, 8.0    Protein, Urine NEGATIVE NEGATIVE, 10 (TRACE), 20 (TRACE) mg/dL    Glucose, Urine Normal Normal mg/dL    Blood, Urine NEGATIVE NEGATIVE    Ketones, Urine NEGATIVE NEGATIVE mg/dL    Bilirubin, Urine NEGATIVE NEGATIVE    Urobilinogen, Urine Normal Normal mg/dL    Nitrite, Urine NEGATIVE NEGATIVE    Leukocyte Esterase, Urine NEGATIVE NEGATIVE   Lactate   Result Value Ref Range    Lactate 0.6 0.4 - 2.0 mmol/L         Assessment/Plan   Assessment & Plan  Abdominal pain    Pneumatosis of intestines  Abdominal pain, left flank pain, unclear etiology  CT imaging showing trace left mid abdominal small bowel pneumatosis  Possible UTI/cystitis  Distal left hydroureter and mild left intrarenal collecting system fullness  past medical history of lymphadenitis, bowel obstruction, cholecystectomy, back surgery, IBS    Plan:  -Admit to OBS  -FULL CODE  -vitals per protocol  -general surgery consulted  -GI  consulted  -urology eval for left hydroureter, patient is supposed to have an outpatient appt on 9/26 with dr rosa  -CT enterography with contrast tomorrow  -continue home meds  -continue cipro for ?UTI/cystitis  -clear liquid diet  -AM labs  -IVF  -PRN pain meds, nausea meds  -supportive care  -will follow patient closely          Cecile Romano, DO

## 2024-09-24 NOTE — ASSESSMENT & PLAN NOTE
Abdominal pain, left flank pain, unclear etiology  CT imaging showing trace left mid abdominal small bowel pneumatosis  Possible UTI/cystitis  Distal left hydroureter and mild left intrarenal collecting system fullness  past medical history of lymphadenitis, bowel obstruction, cholecystectomy, back surgery, IBS

## 2024-09-25 ENCOUNTER — APPOINTMENT (OUTPATIENT)
Dept: RADIOLOGY | Facility: HOSPITAL | Age: 34
DRG: 690 | End: 2024-09-25
Payer: COMMERCIAL

## 2024-09-25 VITALS
HEART RATE: 68 BPM | OXYGEN SATURATION: 100 % | WEIGHT: 220 LBS | BODY MASS INDEX: 30.8 KG/M2 | SYSTOLIC BLOOD PRESSURE: 126 MMHG | DIASTOLIC BLOOD PRESSURE: 68 MMHG | RESPIRATION RATE: 18 BRPM | HEIGHT: 71 IN | TEMPERATURE: 98.2 F

## 2024-09-25 LAB
ERYTHROCYTE [DISTWIDTH] IN BLOOD BY AUTOMATED COUNT: 12 % (ref 11.5–14.5)
HCT VFR BLD AUTO: 39 % (ref 41–52)
HGB BLD-MCNC: 13.2 G/DL (ref 13.5–17.5)
HOLD SPECIMEN: NORMAL
MCH RBC QN AUTO: 30 PG (ref 26–34)
MCHC RBC AUTO-ENTMCNC: 33.8 G/DL (ref 32–36)
MCV RBC AUTO: 89 FL (ref 80–100)
NRBC BLD-RTO: 0 /100 WBCS (ref 0–0)
PLATELET # BLD AUTO: 148 X10*3/UL (ref 150–450)
RBC # BLD AUTO: 4.4 X10*6/UL (ref 4.5–5.9)
WBC # BLD AUTO: 4.7 X10*3/UL (ref 4.4–11.3)

## 2024-09-25 PROCEDURE — 99231 SBSQ HOSP IP/OBS SF/LOW 25: CPT | Performed by: NURSE PRACTITIONER

## 2024-09-25 PROCEDURE — 85027 COMPLETE CBC AUTOMATED: CPT | Performed by: INTERNAL MEDICINE

## 2024-09-25 PROCEDURE — 2550000001 HC RX 255 CONTRASTS: Performed by: INTERNAL MEDICINE

## 2024-09-25 PROCEDURE — 99239 HOSP IP/OBS DSCHRG MGMT >30: CPT | Performed by: INTERNAL MEDICINE

## 2024-09-25 PROCEDURE — 74177 CT ABD & PELVIS W/CONTRAST: CPT | Performed by: RADIOLOGY

## 2024-09-25 PROCEDURE — 2500000004 HC RX 250 GENERAL PHARMACY W/ HCPCS (ALT 636 FOR OP/ED): Performed by: INTERNAL MEDICINE

## 2024-09-25 PROCEDURE — 36415 COLL VENOUS BLD VENIPUNCTURE: CPT | Performed by: INTERNAL MEDICINE

## 2024-09-25 PROCEDURE — 74177 CT ABD & PELVIS W/CONTRAST: CPT

## 2024-09-25 PROCEDURE — 99222 1ST HOSP IP/OBS MODERATE 55: CPT | Performed by: NURSE PRACTITIONER

## 2024-09-25 PROCEDURE — 2500000001 HC RX 250 WO HCPCS SELF ADMINISTERED DRUGS (ALT 637 FOR MEDICARE OP): Performed by: INTERNAL MEDICINE

## 2024-09-25 PROCEDURE — 2500000005 HC RX 250 GENERAL PHARMACY W/O HCPCS: Performed by: INTERNAL MEDICINE

## 2024-09-25 SDOH — ECONOMIC STABILITY: TRANSPORTATION INSECURITY
IN THE PAST 12 MONTHS, HAS THE LACK OF TRANSPORTATION KEPT YOU FROM MEDICAL APPOINTMENTS OR FROM GETTING MEDICATIONS?: NO

## 2024-09-25 SDOH — ECONOMIC STABILITY: HOUSING INSECURITY: AT ANY TIME IN THE PAST 12 MONTHS, WERE YOU HOMELESS OR LIVING IN A SHELTER (INCLUDING NOW)?: NO

## 2024-09-25 SDOH — ECONOMIC STABILITY: INCOME INSECURITY: HOW HARD IS IT FOR YOU TO PAY FOR THE VERY BASICS LIKE FOOD, HOUSING, MEDICAL CARE, AND HEATING?: NOT VERY HARD

## 2024-09-25 SDOH — ECONOMIC STABILITY: INCOME INSECURITY: IN THE PAST 12 MONTHS, HAS THE ELECTRIC, GAS, OIL, OR WATER COMPANY THREATENED TO SHUT OFF SERVICE IN YOUR HOME?: NO

## 2024-09-25 SDOH — SOCIAL STABILITY: SOCIAL NETWORK: ARE YOU MARRIED, WIDOWED, DIVORCED, SEPARATED, NEVER MARRIED, OR LIVING WITH A PARTNER?: MARRIED

## 2024-09-25 SDOH — ECONOMIC STABILITY: INCOME INSECURITY: IN THE LAST 12 MONTHS, WAS THERE A TIME WHEN YOU WERE NOT ABLE TO PAY THE MORTGAGE OR RENT ON TIME?: NO

## 2024-09-25 SDOH — ECONOMIC STABILITY: FOOD INSECURITY: WITHIN THE PAST 12 MONTHS, YOU WORRIED THAT YOUR FOOD WOULD RUN OUT BEFORE YOU GOT MONEY TO BUY MORE.: NEVER TRUE

## 2024-09-25 SDOH — ECONOMIC STABILITY: FOOD INSECURITY: WITHIN THE PAST 12 MONTHS, THE FOOD YOU BOUGHT JUST DIDN'T LAST AND YOU DIDN'T HAVE MONEY TO GET MORE.: NEVER TRUE

## 2024-09-25 SDOH — HEALTH STABILITY: MENTAL HEALTH
HOW OFTEN DO YOU NEED TO HAVE SOMEONE HELP YOU WHEN YOU READ INSTRUCTIONS, PAMPHLETS, OR OTHER WRITTEN MATERIAL FROM YOUR DOCTOR OR PHARMACY?: NEVER

## 2024-09-25 SDOH — ECONOMIC STABILITY: TRANSPORTATION INSECURITY
IN THE PAST 12 MONTHS, HAS LACK OF TRANSPORTATION KEPT YOU FROM MEETINGS, WORK, OR FROM GETTING THINGS NEEDED FOR DAILY LIVING?: NO

## 2024-09-25 ASSESSMENT — PAIN - FUNCTIONAL ASSESSMENT
PAIN_FUNCTIONAL_ASSESSMENT: 0-10

## 2024-09-25 ASSESSMENT — COGNITIVE AND FUNCTIONAL STATUS - GENERAL
MOBILITY SCORE: 24
DAILY ACTIVITIY SCORE: 24

## 2024-09-25 ASSESSMENT — PAIN SCALES - GENERAL
PAINLEVEL_OUTOF10: 5 - MODERATE PAIN
PAINLEVEL_OUTOF10: 3
PAINLEVEL_OUTOF10: 7
PAINLEVEL_OUTOF10: 5 - MODERATE PAIN

## 2024-09-25 ASSESSMENT — PAIN DESCRIPTION - LOCATION
LOCATION: ABDOMEN
LOCATION: BACK

## 2024-09-25 NOTE — DISCHARGE SUMMARY
Discharge Diagnosis  Abdominal pain  Musculoskeletal left sided back pain, reproducible on exam    Issues Requiring Follow-Up  See PCP in 1 week  If you have progressive symptoms such as chills you should check for COVID and your thyroid  Please see urology dr rosa in 6 months  See hepatology dr gio adhikari for follow up hepatomegaly       Discharge Meds     Medication List      CONTINUE taking these medications     buPROPion  mg 24 hr tablet; Commonly known as: Wellbutrin XL; Take   1 tablet (300 mg) by mouth once daily in the morning. Do not crush, chew,   or split.   ciprofloxacin 250 mg tablet; Commonly known as: Cipro; Take 1 tablet   (250 mg) by mouth 2 times a day for 10 days.     STOP taking these medications     HYDROcodone-acetaminophen 5-325 mg tablet; Commonly known as: Norco   hydrocortisone 2.5 % cream   methylPREDNISolone 4 mg tablets; Commonly known as: Medrol Dospak   phentermine 37.5 mg tablet; Commonly known as: Adipex-P   sulfacetamide sodium-sulfur 10-5 % (w/w) topical emulsion; Commonly   known as: Avar       Test Results Pending At Discharge  Pending Labs       Order Current Status    Extra Urine Gray Tube Collected (09/24/24 1215)    Urinalysis with Reflex Culture and Microscopic Collected (09/24/24 1215)            Hospital Course   Sukhdeep Byrd is a 34 y.o. male with past medical history of lymphadenitis, bowel obstruction, cholecystectomy, back surgery, IBS presented with abdominal pain and left flank pain radiating to the left testicle.  Patient was recently seen in the emergency room 2 months ago for right lower quadrant abdominal pain at which time CT of the abdomen showed a large right mesenteric lymph nodes concerning for lymphadenitis.  Patient complains of worsening abdominal and left flank pain so he came into the emergency room for evaluation.  He complains of urinary frequency, he was started on Cipro by his PCP for possible UTI/kidney infection however did not have  a urinalysis done at that time.  Unfortunately UA will likely be negative now that he has been on antibiotics.  Patient was told to see urology and he has an appointment with Dr. Webber in 2 days on 9-26/24 due to CT imaging showing a hydroureter.     Patient's vitals were stable upon arrival.  His basic labs are unremarkable, lactic acid was negative.  Urinalysis was negative.  Ultrasound of the scrotum with Doppler showed unremarkable bilateral testes, bilateral epididymal head cysts or spermatoceles measuring up to 4 mm on the right.  CT of the abdomen showed no definite renal stones, distal left hydroureter and mild left intrarenal collecting system fullness similar to 7-.  Patient has suspected trace left mid abdominal small bowel pneumatosis, no associated inflammatory changes or bowel obstructions.  Patient has mild bladder wall thickening which could be related to cystitis.     General surgery was contacted by the emergency room due to abnormal CT scan showing small bowel pneumatosis, they recommended CT enterography however this cannot be completed until tomorrow.  Patient will be admitted observation for further evaluation of his abdominal pain and left flank pain.  General surgery, GI and urology are consulted.  Patient complains of flank pain, left groin pain, chills and nausea.  He denies chest pain, shortness of breath, vomiting or diarrhea.     Hospital course:  Patient is a very pleasant 34-year-old male with history of lymphadenitis, bowel obstruction, cholecystectomy, back surgery, IBS who presented with abdominal pain and flank pain.  Patient has musculoskeletal reproducible pain on his left flank.  Patient had an abnormal CT so he was admitted for urology, general surgery and GI evaluation.  Patient had possible small bowel pneumatosis however repeat CT enterography showed no evidence of acute abnormality in the abdomen or pelvis, no longer visible area of question pneumatosis.  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  Patient also has mild hepatomegaly and stable splenomegaly with splenic hilar varices.  He is requesting further input from GI regarding the enlarged spleen and liver.  Patient has chronic chills I recommended checking his thyroid and possibly checking for COVID however he has no further infectious signs or symptoms.  He states he will do this with his PCP if symptoms persist.  He is ready to be discharged home.  He has been cleared by all specialist for discharge.  He is told to see his PCP in a week and urology Dr. Webber in 6 months.  Patient is stable for DC home with close follow-up.    Spent>35 minutes DC patient    Pertinent Physical Exam At Time of Discharge  Vitals reviewed.   Constitutional:       Appearance: Normal appearance.   HENT:      Head: Normocephalic and atraumatic.      Nose: Nose normal.   Cardiovascular:      Rate and Rhythm: Normal rate and regular rhythm.      Pulses: Normal pulses.      Heart sounds: Normal heart sounds.   Pulmonary:      Effort: Pulmonary effort is normal.      Breath sounds: Normal breath sounds. No rales.   Abdominal:      Comments: Left flank pain, +musculoskeletal back pain  Skin:     General: Skin is warm and dry.   Neurological:      Mental Status: He is alert and oriented to person, place, and time. Mental status is at baseline.   Psychiatric:         Mood and Affect: Mood normal.     Outpatient Follow-Up  Future Appointments   Date Time Provider Department Center   10/8/2024  8:30 AM Fernando Ambriz MD [id number]   10/9/2024  3:30 PM [id number] [id number] [place]         Cecile Romano DO

## 2024-09-25 NOTE — CONSULTS
Reason For Consult  Small bowel pneumatosis    History Of Present Illness  Sukhdeep Byrd is a 34 y.o. male with PMH of IBS presenting with left flank and abdominal pain radiating to left testicle. He was seen here at [facility] on 7/26/2024 for RLQ pain with CT at that time noting large right mesenteric lymph node with plan to follow-up with PCP and GI. He initially thought it was his appendix. Since that time pain has migrated to his left side, around to the back and down the left side to his left testicle.  He is following up with urology for hydroureter 9/26.  Pain and nausea are now constant. No vomiting. No reported dysphagia.  Motrin has not been helpful for the pain.  He reports that about 10 years ago he was having issues with his colon, stating that NG tube was unable to be passed and he had to have intervention to untwist his colon. No significant issues since that time. He does endorse soft stools/diarrhea past month.  No lightheadedness, dizziness, CP, SOB, dissipation, hematochezia or melena.    Patient is afebrile, HDS on RA.  No significant lab abnormalities. CT A/P noting distal left hydroureter and mid left intrarenal collecting system fullness similar to previous.  Trace left mid abdominal small bowel pneumatosis with no other laboratory changes or obstruction with recommendation for follow-up imaging.    PMH  He has a past medical history of Acute upper respiratory infection, unspecified (06/30/2020), Body mass index (BMI) 34.0-34.9, adult (01/17/2022), Cough, unspecified (02/11/2020), Cough, unspecified (04/07/2020), Encounter for follow-up examination after completed treatment for conditions other than malignant neoplasm (08/12/2016), Impacted cerumen, unspecified ear (02/11/2020), Nausea (08/24/2020), Other acute postprocedural pain (08/12/2016), Other conditions influencing health status (04/07/2020), Other obesity due to excess calories (01/17/2022), Other specified diseases of gallbladder  "(12/16/2015), Pain in right leg (06/15/2020), Pain in right wrist (03/10/2020), Personal history of other diseases of the digestive system (12/16/2015), Personal history of other diseases of the digestive system (12/16/2015), Personal history of other diseases of the musculoskeletal system and connective tissue (07/31/2020), Personal history of other diseases of the respiratory system (02/11/2020), Personal history of other diseases of the respiratory system (02/11/2020), Personal history of other specified conditions (08/23/2019), Personal history of other specified conditions (08/23/2019), and Vomiting, unspecified (03/10/2020).    PSH  He has a past surgical history that includes Cholecystectomy (11/30/2015) and Abdominal adhesion surgery (08/17/2016).    Family  No known GI malignancies    Social  He reports that he has never smoked. He has never used smokeless tobacco. He reports that he does not drink alcohol and does not use drugs.      Review of Systems  ROS negative unless stated otherwise in HPI     Objective  /72 (BP Location: Left arm, Patient Position: Lying)   Pulse 63   Temp 36.4 °C (97.5 °F) (Temporal)   Resp 16   Ht 1.803 m (5' 11\")   Wt 99.8 kg (220 lb)   SpO2 99%   BMI 30.68 kg/m²     Physical Exam  Constitutional: Alert, pleasant and interactive, in NAD  Eyes: PERRL, sclera clear, no conjunctival injection  Skin: Warm and dry, no rash or ecchymosis  ENMT: Mucous membranes moist, no lesions noted  Resp: CTAB, even and unlabored  CV: RRR, normal S1, S2, no m,r,g  GI: +BS, soft, round, L sided TTP, no rebound tenderness or guarding, no palpable masses or organomegaly  Extremities: Extremities warm, no edema, contusions, wounds or cyanosis  Neuro: Alert and oriented x3  Psych: Appropriate mood and behavior    Medications  Scheduled medications  buPROPion XL, 300 mg, oral, q AM  oxygen, , inhalation, Continuous - Inhalation  polyethylene glycol, 17 g, oral, Daily      Continuous " "medications  sodium chloride 0.9%, 100 mL/hr, Last Rate: 100 mL/hr (09/25/24 0657)      PRN medications  PRN medications: acetaminophen **OR** acetaminophen **OR** acetaminophen, HYDROmorphone, ondansetron ODT **OR** ondansetron     Labs  Lab Results   Component Value Date    WBC 4.7 09/25/2024    HGB 13.2 (L) 09/25/2024    HCT 39.0 (L) 09/25/2024    MCV 89 09/25/2024     (L) 09/25/2024     Lab Results   Component Value Date    GLUCOSE 92 09/24/2024    CALCIUM 9.5 09/24/2024     09/24/2024    K 4.6 09/24/2024    CO2 29 09/24/2024     09/24/2024    BUN 14 09/24/2024    CREATININE 0.90 09/24/2024     Lab Results   Component Value Date    ALT 21 09/24/2024    AST 15 09/24/2024    ALKPHOS 54 09/24/2024    BILITOT 1.0 09/24/2024     No results found for: \"IRON\", \"TIBC\", \"FERRITIN\"  Lab Results   Component Value Date    INR 1.3 (H) 07/26/2024    PROTIME 14.6 (H) 07/26/2024       Radiology  CT enterography with contrast 9/25/2024 noting:  IMPRESSION:  1.  No evidence of acute abnormality in the abdomen or pelvis.  2. Previously questioned area of pneumatosis in a left-sided small  bowel loop is no longer visible and was likely intraluminal.  3. Left hydroureteronephrosis to the level of the UVJ has been stable  since at least 2017, suggestive of a chronic left UVJ stenosis.  4. Stable splenomegaly with splenic hilar varices.  5. Mild hepatomegaly.  6. Prior cholecystectomy.  7. Findings of chronic sacroiliitis are stable dating back to at  least 2017.    Signed by: Rosa Wallace 9/25/2024 12:21 PM  Dictation workstation:   [id number]    CT A/P with IV contrast 9/24/2024 noting:  IMPRESSION:  No definite urolithiasis. Distal left hydroureter and mild left  intrarenal collecting system fullness similar to 07/26/2024.      Suspected trace left mid abdominal small bowel pneumatosis. No  associated inflammatory changes or bowel obstruction. Recommend  clinical correlation and follow-up imaging as clinically " warranted.      Mild bladder wall thickening could be related to poor distention  however trabeculation or cystitis could have a similar appearance.      Additional findings as described above.    Signed by: Breana Ralph 9/24/2024 1:43 PM  Dictation workstation:   [id number]    Assessment:  Sukhdeep Byrd is a 34 y.o. male with PMH of IBS presenting with left flank and abdominal pain radiating to left testicle.  Urology following for hydroureter, tiny epid cysts with plan for scrotal US. GI consult for small bowel pneumatosis.  MR enterography obtained noting that previously questioned pneumatosis is no longer visible and was likely intraluminal.    # Left flank pain radiating to back and L testicle  # nausea  # IBS    Plan:  - continue supportive care  - diet per primary team   - continue analgesics and antiemetics as needed  - GI consult for SB pneumatosis was noted to be an over read on CT enterography and is resolved on enterography  - follow up urology workup for other causes of abdominal pain    Thank you for allowing us to participate in care. Please call with any further questions or concerns.      Plan has been discussed with Dr. Wu. GI will sign off.     Bharti Estrada, APRN/CNP

## 2024-09-25 NOTE — PROGRESS NOTES
09/25/24 1006   Discharge Planning   Living Arrangements Spouse/significant other;Children   Support Systems Spouse/significant other   Assistance Needed none   Type of Residence Private residence   Home or Post Acute Services None   Expected Discharge Disposition Home   Financial Resource Strain   How hard is it for you to pay for the very basics like food, housing, medical care, and heating? Not very   Housing Stability   In the last 12 months, was there a time when you were not able to pay the mortgage or rent on time? N   At any time in the past 12 months, were you homeless or living in a shelter (including now)? N   Transportation Needs   In the past 12 months, has lack of transportation kept you from medical appointments or from getting medications? no   In the past 12 months, has lack of transportation kept you from meetings, work, or from getting things needed for daily living? No     Met with patient at bedside. He lives at home with his wife and children in [place]. He is independent and confirms understanding of how to manage his health at home and of his home medications. Patient plans to return home when medically ready for discharge.

## 2024-09-25 NOTE — PROGRESS NOTES
Sukhdeep Byrd 34 y.o. male    Subjective  Patient seen and examined this afternoon.  Reports nausea without emesis.  No fever or chills. Most pain to left flank and towards left side of abdomen.  Reports urinary frequency, otherwise no other symptoms.     Objective  PHYSICAL EXAM:  Physical Exam  Vitals reviewed.   Constitutional:       General: He is awake.      Appearance: Normal appearance.   Cardiovascular:      Rate and Rhythm: Normal rate and regular rhythm.      Pulses: Normal pulses.      Heart sounds: Normal heart sounds.   Pulmonary:      Effort: Pulmonary effort is normal.      Breath sounds: Normal breath sounds and air entry.   Abdominal:      General: Abdomen is flat. There is no distension.      Palpations: Abdomen is soft.      Tenderness: There is abdominal tenderness. There is left CVA tenderness. There is no guarding or rebound.      Comments: Soft, non-distended.  Diffusely TTP to left side of abdomen radiating around to left CVA.    Musculoskeletal:         General: Normal range of motion.      Cervical back: Normal range of motion.   Skin:     General: Skin is warm and dry.   Neurological:      General: No focal deficit present.      Mental Status: He is alert and oriented to person, place, and time.   Psychiatric:         Behavior: Behavior is cooperative.         Vital signs in last 24 hours:  Vitals:    09/25/24 0800   BP: 111/72   Pulse: 63   Resp: 16   Temp: 36.4 °C (97.5 °F)   SpO2: 99%         Intake/Output this shift:    Intake/Output Summary (Last 24 hours) at 9/25/2024 1414  Last data filed at 9/25/2024 0917  Gross per 24 hour   Intake 1425 ml   Output 1125 ml   Net 300 ml        Allergies:  Allergies   Allergen Reactions    Iodides Unknown    Shellfish Derived Rash and Nausea/vomiting    Vancomycin Rash        Medications:  Scheduled medications  buPROPion XL, 300 mg, oral, q AM  oxygen, , inhalation, Continuous - Inhalation  polyethylene glycol, 17 g, oral, Daily      Continuous  medications  sodium chloride 0.9%, 100 mL/hr, Last Rate: 100 mL/hr (09/25/24 0657)      PRN medications  PRN medications: acetaminophen **OR** acetaminophen **OR** acetaminophen, HYDROmorphone, ondansetron ODT **OR** ondansetron       Labs:  Results for orders placed or performed during the hospital encounter of 09/24/24 (from the past 24 hour(s))   Lactate   Result Value Ref Range    Lactate 0.6 0.4 - 2.0 mmol/L   CBC   Result Value Ref Range    WBC 4.7 4.4 - 11.3 x10*3/uL    nRBC 0.0 0.0 - 0.0 /100 WBCs    RBC 4.40 (L) 4.50 - 5.90 x10*6/uL    Hemoglobin 13.2 (L) 13.5 - 17.5 g/dL    Hematocrit 39.0 (L) 41.0 - 52.0 %    MCV 89 80 - 100 fL    MCH 30.0 26.0 - 34.0 pg    MCHC 33.8 32.0 - 36.0 g/dL    RDW 12.0 11.5 - 14.5 %    Platelets 148 (L) 150 - 450 x10*3/uL   PST Top   Result Value Ref Range    Extra Tube Hold for add-ons.         Imaging:  CT enterography w contrast    Result Date: 9/25/2024  Interpreted By:  Rosa Wallace, STUDY: CT ENTEROGRAPHY WITH  IV CONTRAST;  9/25/2024 11:22 am   INDICATION: Signs/Symptoms:pneumotosis on CT.     COMPARISON: CT abdomen and pelvis 09/24/2024, 07/26/2024, 06/22/2021, 10/08/2020, 11/26/2017. scrotal ultrasound 09/24/2024.   ACCESSION NUMBER(S): [id number]   ORDERING CLINICIAN: IVAN VAN   TECHNIQUE: CT of the abdomen and pelvis was performed according to the CT enterography protocol. Standard contiguous axial images were obtained at 3 mm slice thickness through the abdomen and pelvis. Coronal and sagittal reconstructions at 3 mm slice thickness were performed.   900 ml of contrast Omnipaque 350 were administered intravenously without immediate complication. 500 mL Breeza were administered orally.   FINDINGS: LOWER CHEST: Lung bases are clear.   ABDOMEN:   LIVER: Liver is mildly enlarged to a craniocaudal length of 18 cm. No focal liver lesion.   BILE DUCTS: No intra or extrahepatic biliary ductal dilation.   GALLBLADDER: Prior cholecystectomy.   PANCREAS: No focal  pancreatic or peripancreatic abnormality.   SPLEEN: Spleen is mildly enlarged to 14.2 cm in AP dimension. No focal lesion.   ADRENAL GLANDS: Normal bilateral adrenal glands.   KIDNEYS AND URETERS: No focal or perinephric abnormality. No right hydronephrosis. Chronic diffuse left hydroureteronephrosis in seen up to the level of the UVJ and is not significantly changed dating back to at least 2017. This may be due to underlying left UVJ stenosis.   PELVIS:   BLADDER: Urinary bladder is well distended without wall thickening or perivesical inflammatory changes.   REPRODUCTIVE ORGANS: The prostate is not enlarged.   BOWEL: Stomach, small bowel loops and colon are normal in caliber without wall thickening or adjacent inflammatory stranding. The previously questioned area of pneumatosis in a left-sided small bowel loop is no longer visualized. Appendix is normal.   VESSELS: Abdominal aorta is normal in caliber. Prominent splenic vein/splenic hilar varices are stable. No portal venous gas.   PERITONEUM/RETROPERITONEUM/LYMPH NODES: No ascites or free air, no fluid collection.  No abdominopelvic lymphadenopathy is present.   BONES AND ABDOMINAL WALL: No acute fracture or suspicious bone lesion. Stable subchondral sclerosis and lucency/irregularity of the bilateral sacroiliac joints, predominantly on the iliac side. This is unchanged since 2017 and may be due to sequela of sacroiliitis.       1.  No evidence of acute abnormality in the abdomen or pelvis. 2. Previously questioned area of pneumatosis in a left-sided small bowel loop is no longer visible and was likely intraluminal. 3. 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 4. Stable splenomegaly with splenic hilar varices. 5. Mild hepatomegaly. 6. Prior cholecystectomy. 7. Findings of chronic sacroiliitis are stable dating back to at least 2017.     MACRO: None   Signed by: Rosa Wallace  9/25/2024 12:21 PM Dictation workstation:   [id number]    US scrotum w doppler    Result Date: 9/24/2024  Interpreted By:  Breana Ralph, STUDY: US SCROTUM WITH DOPPLER;  9/24/2024 3:36 pm   INDICATION:   Signs/Symptoms:L testicular pain, hydroureter.     COMPARISON: None.   ACCESSION NUMBER(S): [id number]   ORDERING CLINICIAN: IVAN VAN   TECHNIQUE: Multiple ultrasonographic images of scrotum and testes were obtained.   FINDINGS: RIGHT HEMISCROTUM:   RIGHT TESTICLE:   Measures 4.9 x 2.4 x 2.8 cm. Echogenicity within normal limits. No focal lesion demonstrated. Intact flow on color and pulsed Doppler evaluation.   RIGHT EPIDIDYMIS:   4 mm cystic lesion containing a few septations in the head. 2 mm cyst in the anterior head   No significant peritesticular fluid or dilated peritesticular veins demonstrated.     LEFT HEMISCROTUM:   LEFT TESTICLE:   Measures 4.5 x 2.5 by 3.1 cm. Echogenicity within normal limits. No focal lesion demonstrated. Intact flow on color and pulsed Doppler evaluation.   LEFT EPIDIDYMIS:   2 mm cyst in the head.   No significant peritesticular fluid or dilated peritesticular veins demonstrated.       Sonographically unremarkable bilateral testes.   Bilateral epididymal head cysts or spermatoceles measuring up to 4 mm on the right.   MACRO: None.   Signed by: Breana Ralph 9/24/2024 4:09 PM Dictation workstation:   [id number]    CT abdomen pelvis w IV contrast    Result Date: 9/24/2024  Interpreted By:  Breana Ralph, STUDY: CT ABDOMEN PELVIS W IV CONTRAST;  9/24/2024 1:30 pm   INDICATION: Signs/Symptoms:LLQ pain radiating to L testicle.     COMPARISON: 07/26/2024   ACCESSION NUMBER(S): [id number]   ORDERING CLINICIAN: IVAN VAN   TECHNIQUE: CT of the abdomen and pelvis was performed. Sagittal and coronal reconstructions were generated. 90 cc Omnipaque 350 intravenous contrast given for the exam.   FINDINGS:     ABDOMINAL ORGANS:   LIVER: No focal lesion   GALL BLADDER AND BILIARY  TREE: Status post cholecystectomy. Mild intrahepatic biliary tree fullness similar to the previous exam.   SPLEEN: No focal lesion   PANCREAS: No focal lesion   ADRENALS: No adrenal mass   KIDNEYS AND URETERS: Slight left intrarenal collecting system fullness and fusiform dilatation of the distal left ureter similar to the previous exam. No renal or ureteral calculus within limits of contrast-enhanced exam. No focal renal mass within limits of nephrogram phase images.   BOWEL: No abnormally dilated large or small bowel loops. Thin crescentic lucency, possible pneumatosis, in left mid abdominal small bowel loop image 86/217. No surrounding inflammatory changes. Dots of air in nondilated cecal appendix.   PERITONEUM, RETROPERITONEUM, NODES: No significant free fluid. No free air. No significant retroperitoneal lymphadenopathy. Subcentimeter lymph nodes in the right lower quadrant mesentery.   VESSELS:  Abdominal aorta is normal in caliber. Enhancing splenic hilar varices similar to the prior exam.   PELVIS: Urinary bladder is partially distended with mildly thickened wall. No calcified bladder stone. No gross prostate enlargement.   ABDOMINAL WALL: No sizable abdominal wall hernia.   BONES: Ill-defined sclerosis around left-greater-than-right SI joints. Subcentimeter sclerotic focus in the superior left iliac bone image 127/217. Degenerative changes of the lumbar spine. These findings are similar to the prior exam.   LOWER CHEST: No airspace consolidation or pleural effusion in the included areas.       No definite urolithiasis. Distal left hydroureter and mild left intrarenal collecting system fullness similar to 07/26/2024.   Suspected trace left mid abdominal small bowel pneumatosis. No associated inflammatory changes or bowel obstruction. Recommend clinical correlation and follow-up imaging as clinically warranted.   Mild bladder wall thickening could be related to poor distention however trabeculation or cystitis  could have a similar appearance.   Additional findings as described above.   MACRO: None.   Signed by: Breana Ralph 9/24/2024 1:43 PM Dictation workstation:   [id number]      Plan  Abdominal pain   #Possible pneumatosis     - OK to resume diet  - MR enterography - No evidence of acute abnormality in the abdomen or pelvis. Previously questioned area of pneumatosis in a left-sided small bowel loop is no longer visible and was likely intraluminal.   - No leukocytosis WBC 4.7  - Lactate 0.6  - No acute surgical intervention planned     Plan of care discussed with Dr. Zafar and surgery will sign off.  Please call with any concerns.     THALIA Thakur-CNP    I spent 15 minutes in the professional and overall care of this patient.

## 2024-09-25 NOTE — NURSING NOTE
1100- Patient going to CT.    1130- Patient returned to unit.    EOS- No acute changes throughout the shift. Patient received pain medication twice with decrease in pain and zofran once with relief. IV fluids completed. Discharge instructions gone over and all questions answered. Safety maintained and call light within reach.

## 2024-09-25 NOTE — CONSULTS
Reason For Consult  Abnl ct    History Of Present Illness  Sukhdeep Byrd is a 34 y.o. male presenting with abd/flank pain.    Mostly left sided but also into testis.    Had right abd pain 7/2024.   Has had multiple ct's abd/pelvis dating back to 2017 (I have reviewed all)---essentially the left hydroureter causes no sig hydro on left or parenchymal loss and is likely congenital.    Scrotal us this admit shows tiny epid cysts.    Labs/xrays/notes indep reviewed/interpreted.     Past Medical History  He has a past medical history of Acute upper respiratory infection, unspecified (06/30/2020), Body mass index (BMI) 34.0-34.9, adult (01/17/2022), Cough, unspecified (02/11/2020), Cough, unspecified (04/07/2020), Encounter for follow-up examination after completed treatment for conditions other than malignant neoplasm (08/12/2016), Impacted cerumen, unspecified ear (02/11/2020), Nausea (08/24/2020), Other acute postprocedural pain (08/12/2016), Other conditions influencing health status (04/07/2020), Other obesity due to excess calories (01/17/2022), Other specified diseases of gallbladder (12/16/2015), Pain in right leg (06/15/2020), Pain in right wrist (03/10/2020), Personal history of other diseases of the digestive system (12/16/2015), Personal history of other diseases of the digestive system (12/16/2015), Personal history of other diseases of the musculoskeletal system and connective tissue (07/31/2020), Personal history of other diseases of the respiratory system (02/11/2020), Personal history of other diseases of the respiratory system (02/11/2020), Personal history of other specified conditions (08/23/2019), Personal history of other specified conditions (08/23/2019), and Vomiting, unspecified (03/10/2020).    Surgical History  He has a past surgical history that includes Cholecystectomy (11/30/2015) and Abdominal adhesion surgery (08/17/2016).     Social History  He reports that he has never smoked. He has  "never used smokeless tobacco. He reports that he does not drink alcohol and does not use drugs.    Family History  No family history on file.     Allergies  Iodides, Shellfish derived, and Vancomycin    Review of Systems       Physical Exam     Gu--nl phallus  Testes desc bilateral and non-tender  Last Recorded Vitals  Blood pressure 130/84, pulse 70, temperature 36.8 °C (98.2 °F), temperature source Temporal, resp. rate 17, height 1.803 m (5' 11\"), weight 99.8 kg (220 lb), SpO2 99%.    Relevant Results    Results for orders placed or performed during the hospital encounter of 09/24/24 (from the past 24 hour(s))   Light Blue Top   Result Value Ref Range    Extra Tube Hold for add-ons.    Lavender Top   Result Value Ref Range    Extra Tube Hold for add-ons.    PST Top   Result Value Ref Range    Extra Tube Hold for add-ons.    CBC with Differential   Result Value Ref Range    WBC 6.8 4.4 - 11.3 x10*3/uL    nRBC 0.0 0.0 - 0.0 /100 WBCs    RBC 4.80 4.50 - 5.90 x10*6/uL    Hemoglobin 14.3 13.5 - 17.5 g/dL    Hematocrit 42.4 41.0 - 52.0 %    MCV 88 80 - 100 fL    MCH 29.8 26.0 - 34.0 pg    MCHC 33.7 32.0 - 36.0 g/dL    RDW 12.1 11.5 - 14.5 %    Platelets 190 150 - 450 x10*3/uL    Neutrophils % 63.0 40.0 - 80.0 %    Immature Granulocytes %, Automated 0.3 0.0 - 0.9 %    Lymphocytes % 27.0 13.0 - 44.0 %    Monocytes % 6.5 2.0 - 10.0 %    Eosinophils % 2.8 0.0 - 6.0 %    Basophils % 0.4 0.0 - 2.0 %    Neutrophils Absolute 4.26 1.20 - 7.70 x10*3/uL    Immature Granulocytes Absolute, Automated 0.02 0.00 - 0.70 x10*3/uL    Lymphocytes Absolute 1.83 1.20 - 4.80 x10*3/uL    Monocytes Absolute 0.44 0.10 - 1.00 x10*3/uL    Eosinophils Absolute 0.19 0.00 - 0.70 x10*3/uL    Basophils Absolute 0.03 0.00 - 0.10 x10*3/uL   Comprehensive Metabolic Panel   Result Value Ref Range    Glucose 92 74 - 99 mg/dL    Sodium 142 136 - 145 mmol/L    Potassium 4.6 3.5 - 5.3 mmol/L    Chloride 105 98 - 107 mmol/L    Bicarbonate 29 21 - 32 mmol/L    " Anion Gap 13 10 - 20 mmol/L    Urea Nitrogen 14 6 - 23 mg/dL    Creatinine 0.90 0.50 - 1.30 mg/dL    eGFR >90 >60 mL/min/1.73m*2    Calcium 9.5 8.6 - 10.3 mg/dL    Albumin 4.6 3.4 - 5.0 g/dL    Alkaline Phosphatase 54 33 - 120 U/L    Total Protein 7.1 6.4 - 8.2 g/dL    AST 15 9 - 39 U/L    Bilirubin, Total 1.0 0.0 - 1.2 mg/dL    ALT 21 10 - 52 U/L   Urinalysis with Reflex Microscopic   Result Value Ref Range    Color, Urine Yellow Light-Yellow, Yellow, Dark-Yellow    Appearance, Urine Clear Clear    Specific Gravity, Urine 1.024 1.005 - 1.035    pH, Urine 6.0 5.0, 5.5, 6.0, 6.5, 7.0, 7.5, 8.0    Protein, Urine NEGATIVE NEGATIVE, 10 (TRACE), 20 (TRACE) mg/dL    Glucose, Urine Normal Normal mg/dL    Blood, Urine NEGATIVE NEGATIVE    Ketones, Urine NEGATIVE NEGATIVE mg/dL    Bilirubin, Urine NEGATIVE NEGATIVE    Urobilinogen, Urine Normal Normal mg/dL    Nitrite, Urine NEGATIVE NEGATIVE    Leukocyte Esterase, Urine NEGATIVE NEGATIVE   Urine Gray Tube   Result Value Ref Range    Extra Tube Hold for add-ons.    Lactate   Result Value Ref Range    Lactate 0.6 0.4 - 2.0 mmol/L   CBC   Result Value Ref Range    WBC 4.7 4.4 - 11.3 x10*3/uL    nRBC 0.0 0.0 - 0.0 /100 WBCs    RBC 4.40 (L) 4.50 - 5.90 x10*6/uL    Hemoglobin 13.2 (L) 13.5 - 17.5 g/dL    Hematocrit 39.0 (L) 41.0 - 52.0 %    MCV 89 80 - 100 fL    MCH 30.0 26.0 - 34.0 pg    MCHC 33.8 32.0 - 36.0 g/dL    RDW 12.0 11.5 - 14.5 %    Platelets 148 (L) 150 - 450 x10*3/uL       US scrotum w doppler    Result Date: 9/24/2024  Interpreted By:  Breana Ralph, STUDY: US SCROTUM WITH DOPPLER;  9/24/2024 3:36 pm   INDICATION:   Signs/Symptoms:L testicular pain, hydroureter.     COMPARISON: None.   ACCESSION NUMBER(S): [id number]   ORDERING CLINICIAN: IVAN VAN   TECHNIQUE: Multiple ultrasonographic images of scrotum and testes were obtained.   FINDINGS: RIGHT HEMISCROTUM:   RIGHT TESTICLE:   Measures 4.9 x 2.4 x 2.8 cm. Echogenicity within normal limits. No focal lesion  demonstrated. Intact flow on color and pulsed Doppler evaluation.   RIGHT EPIDIDYMIS:   4 mm cystic lesion containing a few septations in the head. 2 mm cyst in the anterior head   No significant peritesticular fluid or dilated peritesticular veins demonstrated.     LEFT HEMISCROTUM:   LEFT TESTICLE:   Measures 4.5 x 2.5 by 3.1 cm. Echogenicity within normal limits. No focal lesion demonstrated. Intact flow on color and pulsed Doppler evaluation.   LEFT EPIDIDYMIS:   2 mm cyst in the head.   No significant peritesticular fluid or dilated peritesticular veins demonstrated.       Sonographically unremarkable bilateral testes.   Bilateral epididymal head cysts or spermatoceles measuring up to 4 mm on the right.   MACRO: None.   Signed by: Breana Ralph 9/24/2024 4:09 PM Dictation workstation:   [id number]    CT abdomen pelvis w IV contrast    Result Date: 9/24/2024  Interpreted By:  Breana Ralph, STUDY: CT ABDOMEN PELVIS W IV CONTRAST;  9/24/2024 1:30 pm   INDICATION: Signs/Symptoms:LLQ pain radiating to L testicle.     COMPARISON: 07/26/2024   ACCESSION NUMBER(S): [id number]   ORDERING CLINICIAN: IVAN VAN   TECHNIQUE: CT of the abdomen and pelvis was performed. Sagittal and coronal reconstructions were generated. 90 cc Omnipaque 350 intravenous contrast given for the exam.   FINDINGS:     ABDOMINAL ORGANS:   LIVER: No focal lesion   GALL BLADDER AND BILIARY TREE: Status post cholecystectomy. Mild intrahepatic biliary tree fullness similar to the previous exam.   SPLEEN: No focal lesion   PANCREAS: No focal lesion   ADRENALS: No adrenal mass   KIDNEYS AND URETERS: Slight left intrarenal collecting system fullness and fusiform dilatation of the distal left ureter similar to the previous exam. No renal or ureteral calculus within limits of contrast-enhanced exam. No focal renal mass within limits of nephrogram phase images.   BOWEL: No abnormally dilated large or small bowel loops. Thin crescentic lucency,  possible pneumatosis, in left mid abdominal small bowel loop image 86/217. No surrounding inflammatory changes. Dots of air in nondilated cecal appendix.   PERITONEUM, RETROPERITONEUM, NODES: No significant free fluid. No free air. No significant retroperitoneal lymphadenopathy. Subcentimeter lymph nodes in the right lower quadrant mesentery.   VESSELS:  Abdominal aorta is normal in caliber. Enhancing splenic hilar varices similar to the prior exam.   PELVIS: Urinary bladder is partially distended with mildly thickened wall. No calcified bladder stone. No gross prostate enlargement.   ABDOMINAL WALL: No sizable abdominal wall hernia.   BONES: Ill-defined sclerosis around left-greater-than-right SI joints. Subcentimeter sclerotic focus in the superior left iliac bone image 127/217. Degenerative changes of the lumbar spine. These findings are similar to the prior exam.   LOWER CHEST: No airspace consolidation or pleural effusion in the included areas.       No definite urolithiasis. Distal left hydroureter and mild left intrarenal collecting system fullness similar to 07/26/2024.   Suspected trace left mid abdominal small bowel pneumatosis. No associated inflammatory changes or bowel obstruction. Recommend clinical correlation and follow-up imaging as clinically warranted.   Mild bladder wall thickening could be related to poor distention however trabeculation or cystitis could have a similar appearance.   Additional findings as described above.   MACRO: None.   Signed by: Breana Ralph 9/24/2024 1:43 PM Dictation workstation:   [id number]          Assessment/Plan     -left hydroureter  -tiny epid cysts  -urinary frequency    Should see me back in 6 months  Can stop abx from gu point of view    Overall the ct's are stable and hydroureter is not source of pain in general.   Can do a renal scan to look for any obstruction from this but based on serial ct's showing no change back to 2017 at a minimum, is very unlikely  source of pain.    Ua is normal as is wbc so doubt any significant infection.   I would favor msk/neurologic source for his pain more than anything.        Has had multiple ct's abd/pelvis dating back to 2017 (I have reviewed all)---essentially the left hydroureter causes no sig hydro on left or parenchymal loss and is likely congenital.    Scrotal us this admit shows tiny epid cysts.    Michael Webber MD

## 2024-09-25 NOTE — DISCHARGE INSTRUCTIONS
See PCP in 1 week  If you have progressive symptoms such as chills you should check for COVID and your thyroid  Please see urology dr rosa in 6 months  Please see hepatology Dr Galo Watts for evaluation of hepatomegaly    It was a pleasure taking care of you in the hospital,  -Dr. Cecile Romano DO

## 2024-09-26 ENCOUNTER — TELEPHONE (OUTPATIENT)
Dept: PRIMARY CARE | Facility: CLINIC | Age: 34
End: 2024-09-26

## 2024-09-26 ENCOUNTER — APPOINTMENT (OUTPATIENT)
Dept: GASTROENTEROLOGY | Facility: CLINIC | Age: 34
End: 2024-09-26
Payer: COMMERCIAL

## 2024-09-26 NOTE — TELEPHONE ENCOUNTER
Imelda, (on HIPAA) calling he was admitted to [facility]  9/24/24-9/25/25, he has swollen, liver,spleen, and lymph nodes near appendix, no kidney issues, had labs done, They want you to look over the notes from the hospital and advise the next steps they should take,, They were supposed to see gastro but appt cancelled by doctor, were told that there was nothing they could do for him. Please advise? Know Hiti is out till monday, asked if Dr. Mariee could offer any advise or info.  [phone]

## 2024-09-26 NOTE — TELEPHONE ENCOUNTER
Michael Mariee, DO  You; Do [id number] ClericalJust now (5:36 PM)       They should make an appoint with Dr. Dr alvarenga on Monday so they can go over all the results and do anything further they might need to look into, okay to squeeze him in       [facility].  Scheduled appointment for 9/30/24

## 2024-09-27 ENCOUNTER — PATIENT OUTREACH (OUTPATIENT)
Dept: CARE COORDINATION | Facility: CLINIC | Age: 34
End: 2024-09-27
Payer: COMMERCIAL

## 2024-09-27 NOTE — PROGRESS NOTES
Discharge Facility: [facility] [place]   Discharge Diagnosis: Pneumatosis of intestines  Epigastric pain  Admission Date: 9/24/24  Discharge Date: 9/25/24    PCP Appointment Date: Naseem Aguila MD 9/30/24  Specialist Appointment Date: TBD  Hospital Encounter and Summary Linked: Yes      Two attempts were made to reach patient within two business days after discharge. Voicemail left with contact information for patient to call back with any non-emergent questions or concerns.

## 2024-09-30 ENCOUNTER — APPOINTMENT (OUTPATIENT)
Dept: PRIMARY CARE | Facility: CLINIC | Age: 34
End: 2024-09-30
Payer: COMMERCIAL

## 2024-09-30 ENCOUNTER — LAB (OUTPATIENT)
Dept: LAB | Facility: LAB | Age: 34
End: 2024-09-30
Payer: COMMERCIAL

## 2024-09-30 DIAGNOSIS — R16.1 SPLEEN ENLARGED: ICD-10-CM

## 2024-09-30 DIAGNOSIS — Z09 HOSPITAL DISCHARGE FOLLOW-UP: ICD-10-CM

## 2024-09-30 DIAGNOSIS — R16.0 ENLARGED LIVER: ICD-10-CM

## 2024-09-30 DIAGNOSIS — K63.89 PNEUMATOSIS OF INTESTINES: Primary | ICD-10-CM

## 2024-09-30 DIAGNOSIS — K58.9 IRRITABLE BOWEL SYNDROME, UNSPECIFIED TYPE: ICD-10-CM

## 2024-09-30 DIAGNOSIS — R10.13 EPIGASTRIC PAIN: ICD-10-CM

## 2024-09-30 DIAGNOSIS — M54.17 LUMBOSACRAL RADICULOPATHY: ICD-10-CM

## 2024-09-30 LAB — ERYTHROCYTE [SEDIMENTATION RATE] IN BLOOD BY WESTERGREN METHOD: 1 MM/H (ref 0–15)

## 2024-09-30 PROCEDURE — 99213 OFFICE O/P EST LOW 20 MIN: CPT | Performed by: FAMILY MEDICINE

## 2024-09-30 PROCEDURE — 85652 RBC SED RATE AUTOMATED: CPT

## 2024-09-30 PROCEDURE — 86038 ANTINUCLEAR ANTIBODIES: CPT

## 2024-09-30 PROCEDURE — 1036F TOBACCO NON-USER: CPT | Performed by: FAMILY MEDICINE

## 2024-09-30 PROCEDURE — 36415 COLL VENOUS BLD VENIPUNCTURE: CPT

## 2024-09-30 RX ORDER — METHYLPREDNISOLONE 4 MG/1
TABLET ORAL
Qty: 21 TABLET | Refills: 0 | Status: SHIPPED | OUTPATIENT
Start: 2024-09-30

## 2024-09-30 ASSESSMENT — ENCOUNTER SYMPTOMS
CONSTIPATION: 0
FLATUS: 0
DIARRHEA: 1
DYSURIA: 0
HEMATURIA: 0
NAUSEA: 1
ABDOMINAL PAIN: 1
HEMATOCHEZIA: 0
ANOREXIA: 1
VOMITING: 0
ARTHRALGIAS: 1
MYALGIAS: 1
HEADACHES: 0
FREQUENCY: 1
FEVER: 0
BELCHING: 1
WEIGHT LOSS: 0

## 2024-09-30 NOTE — PROGRESS NOTES
Subjective   Patient ID: Sukhdepe Byrd is a 34 y.o. male who presents for Hospital Follow-up and pneumatosis of intestines.  HPI    Discharge Facility: [facility]   Discharge Diagnosis: Pneumatosis of intestines  Epigastric pain  Admission Date: 9/24/24  Discharge Date: 9/25/24    He states the CT scan showed his liver and spleen were enlarged.   He states he is feeling better overall but the pain in his abdomen is still there.     He states it was brought up that he should get BW for autoimmune diseases.  Would like to discuss this.     PCP Appointment Date: Naseem Aguila MD 9/30/24  Specialist Appointment Date: TBD    Taking current medications which were reviewed.  Problem list discussed.    Overall doing a little better  Eating okay.  Staying active.    Has no other new problem /question.     ROS  Constitutional- No activity change. No appetite change.  Eyes- Denies vision changes.  Respiratory- No shortness of breath.  Cardiovascular- No palpitations. No chest pain.  GI- No nausea or vomiting. No diarrhea or constipation. Denies abdominal pain.  Musculoskeletal- Denies joint swelling.  Neurological- Denies headaches. Denies dizziness.  Psychiatric/Behavioral- Denies significant anxiety, or depressed mood.     Objective     There were no vitals taken for this visit.    Allergies   Allergen Reactions    Iodides Unknown    Shellfish Derived Rash and Nausea/vomiting    Vancomycin Rash       This was a telehealth video visit.    Patient was alert and oriented during our conversation.  Spoke in complete sentences without evidence of shortness of breath.  Mood was normal.    Assessment/Plan   1. Pneumatosis of intestines        2. Spleen enlarged  IGNACIO without Reflex BRAD    Sedimentation Rate      3. Irritable bowel syndrome, unspecified type  IGNACIO without Reflex BRAD    Sedimentation Rate      4. Epigastric pain        5. Enlarged liver  IGNCAIO without Reflex BRAD    Sedimentation Rate      6. Lumbosacral  radiculopathy  methylPREDNISolone (Medrol Dospak) 4 mg tablets      7. Hospital discharge follow-up               Long talk. Treatment options reviewed.  Lab and diagnostic results reviewed with him in detail.  Has follow-up scheduled with hepatology.  Urology will see him back in about 6 months.  Talked about his symptoms and there was a concern about a possible underlying autoimmune condition.  Will obtain sed rate and IGNACIO.    Trial of Medrol Dosepak to be started after lab work is done which he plans on getting done today  Continue and take your medications as prescribed.      Importance of healthy diet and regular exercise regimen discussed.    We will contact you with any test results ordered. If you do not hear from us, please contact.    Further workup and treatment depending on how he does and test results  Follow-up as instructed or sooner if any problems or symptoms do not resolve as expected.

## 2024-10-01 LAB — ANA SER QL HEP2 SUBST: NEGATIVE

## 2024-10-02 ENCOUNTER — TELEPHONE (OUTPATIENT)
Dept: PRIMARY CARE | Facility: CLINIC | Age: 34
End: 2024-10-02
Payer: COMMERCIAL

## 2024-10-02 NOTE — TELEPHONE ENCOUNTER
----- Message from Naseem Aguila sent at 10/2/2024  9:15 AM EDT -----  Please let him know that all his inflammatory tests came back within normal limits which is good news.  Follow-up per routine.  Please let him know

## 2024-10-08 ENCOUNTER — APPOINTMENT (OUTPATIENT)
Dept: UROLOGY | Facility: HOSPITAL | Age: 34
End: 2024-10-08
Payer: COMMERCIAL

## 2024-10-09 ENCOUNTER — APPOINTMENT (OUTPATIENT)
Dept: RADIOLOGY | Facility: CLINIC | Age: 34
End: 2024-10-09
Payer: COMMERCIAL

## 2024-10-14 ENCOUNTER — TELEPHONE (OUTPATIENT)
Dept: PRIMARY CARE | Facility: CLINIC | Age: 34
End: 2024-10-14
Payer: COMMERCIAL

## 2024-10-14 DIAGNOSIS — R10.9 ABDOMINAL PAIN: Primary | ICD-10-CM

## 2024-10-14 DIAGNOSIS — R10.9 STOMACH PAIN: ICD-10-CM

## 2024-10-14 DIAGNOSIS — R16.0 ENLARGED LIVER: ICD-10-CM

## 2024-10-14 DIAGNOSIS — R10.31 RIGHT LOWER QUADRANT ABDOMINAL PAIN: ICD-10-CM

## 2024-10-14 DIAGNOSIS — R16.1 SPLEEN ENLARGED: Primary | ICD-10-CM

## 2024-10-14 DIAGNOSIS — K59.00 CONSTIPATION: ICD-10-CM

## 2024-10-14 RX ORDER — TRAMADOL HYDROCHLORIDE 50 MG/1
50 TABLET ORAL EVERY 6 HOURS PRN
Qty: 15 TABLET | Refills: 0 | Status: SHIPPED | OUTPATIENT
Start: 2024-10-14 | End: 2024-10-21

## 2024-10-14 NOTE — TELEPHONE ENCOUNTER
Naseem Aguila MD  Do [id number] minutes ago (12:50 PM)       Please let him know I sent in a prescription for pain medication to be taken as directed.  I also would recommend referral to GI/stomach specialist.  Please arrange for GI referral with diagnosis of abdominal pain and enlarged liver.  Please arrange and let him know

## 2024-10-14 NOTE — TELEPHONE ENCOUNTER
"Patient is calling because he had a telemedicine appointment with you on 9/30/24 for follow up from [place] [facility] for pneumatosis of intestines. Was given steroids and told to call back and let you know how they were doing. He states he's doing worse. He states the stomach pain is a \"stabby\" pain especially when he sits down. It's more painful and cramps up when he sits down. He wanted to know if he could get something for the pain? He wanted to know what the next step should be?    Please advise    Thanks      Patient's # [phone]    Preferred pharmacy [facility]  "

## 2024-10-15 ENCOUNTER — PATIENT OUTREACH (OUTPATIENT)
Dept: CARE COORDINATION | Facility: CLINIC | Age: 34
End: 2024-10-15
Payer: COMMERCIAL

## 2024-10-31 ENCOUNTER — LAB (OUTPATIENT)
Dept: LAB | Facility: LAB | Age: 34
End: 2024-10-31
Payer: COMMERCIAL

## 2024-10-31 ENCOUNTER — OFFICE VISIT (OUTPATIENT)
Dept: GASTROENTEROLOGY | Facility: CLINIC | Age: 34
End: 2024-10-31
Payer: COMMERCIAL

## 2024-10-31 VITALS
SYSTOLIC BLOOD PRESSURE: 139 MMHG | RESPIRATION RATE: 16 BRPM | WEIGHT: 222 LBS | HEIGHT: 71 IN | DIASTOLIC BLOOD PRESSURE: 86 MMHG | HEART RATE: 81 BPM | BODY MASS INDEX: 31.08 KG/M2 | TEMPERATURE: 97.3 F

## 2024-10-31 DIAGNOSIS — R16.0 HEPATOMEGALY: ICD-10-CM

## 2024-10-31 DIAGNOSIS — R16.1 SPLENOMEGALY: Primary | ICD-10-CM

## 2024-10-31 DIAGNOSIS — R16.1 SPLENOMEGALY: ICD-10-CM

## 2024-10-31 PROCEDURE — 86015 ACTIN ANTIBODY EACH: CPT

## 2024-10-31 PROCEDURE — 85025 COMPLETE CBC W/AUTO DIFF WBC: CPT

## 2024-10-31 PROCEDURE — 86376 MICROSOMAL ANTIBODY EACH: CPT

## 2024-10-31 PROCEDURE — 86663 EPSTEIN-BARR ANTIBODY: CPT

## 2024-10-31 PROCEDURE — 86038 ANTINUCLEAR ANTIBODIES: CPT

## 2024-10-31 PROCEDURE — 86645 CMV ANTIBODY IGM: CPT

## 2024-10-31 PROCEDURE — 86664 EPSTEIN-BARR NUCLEAR ANTIGEN: CPT

## 2024-10-31 PROCEDURE — 86665 EPSTEIN-BARR CAPSID VCA: CPT

## 2024-10-31 PROCEDURE — 80053 COMPREHEN METABOLIC PANEL: CPT

## 2024-10-31 PROCEDURE — 86381 MITOCHONDRIAL ANTIBODY EACH: CPT

## 2024-10-31 PROCEDURE — 83550 IRON BINDING TEST: CPT

## 2024-10-31 PROCEDURE — 86644 CMV ANTIBODY: CPT

## 2024-10-31 PROCEDURE — 82728 ASSAY OF FERRITIN: CPT

## 2024-10-31 PROCEDURE — 81256 HFE GENE: CPT

## 2024-10-31 PROCEDURE — 99214 OFFICE O/P EST MOD 30 MIN: CPT | Performed by: NURSE PRACTITIONER

## 2024-10-31 PROCEDURE — 83540 ASSAY OF IRON: CPT

## 2024-10-31 PROCEDURE — 3008F BODY MASS INDEX DOCD: CPT | Performed by: NURSE PRACTITIONER

## 2024-10-31 PROCEDURE — 99204 OFFICE O/P NEW MOD 45 MIN: CPT | Performed by: NURSE PRACTITIONER

## 2024-10-31 PROCEDURE — 36415 COLL VENOUS BLD VENIPUNCTURE: CPT

## 2024-10-31 PROCEDURE — 82784 ASSAY IGA/IGD/IGG/IGM EACH: CPT

## 2024-10-31 ASSESSMENT — PAIN SCALES - GENERAL: PAINLEVEL_OUTOF10: 6

## 2024-10-31 NOTE — PROGRESS NOTES
Patient ID: Sukhdeep Byrd is a 34 y.o. male who presents for enlarged liver and spleen.    HPI  34 year old with no major medical history referred for findings of enlarged liver and spleen.    He was in the ED in July for severe right lower quadrant pain, though to be appendicitis-which was ruled out.  He was found to have mesenteric lymphadenopathy of unclear etiology and IBS.    He later returned for the same symptoms in September, thought to be small bowel pneumatosis which later on CT enterography was not present.  He was found to have mild hepatomegaly and splenomegaly with splenic hilar varices     Since then he has been feeling poorly with whole body aches, chills, diaphoresis and at times his legs feeling like they weigh a million pounds.  He finds it hard to also carry 25lb dog.     He has not lost weight but has not had a great appetite.       Labs during hospitalization were normal from a liver standpoint.  Only with last admission was his plt count a little low.  He had an episode in 2020 of very abnormal liver enzymes.    Denies jaundice, icterus, itching, abdominal distension, edema, bleeding or confusion.    Denies fevers, chills, abdominal pain.       Review of Systems   Constitutional:  Negative for appetite change, chills, fever and unexpected weight change.   HENT:  Negative for mouth sores, nosebleeds, trouble swallowing and voice change.    Eyes:  Negative for visual disturbance.   Respiratory:  Negative for cough, shortness of breath and wheezing.    Cardiovascular:  Negative for chest pain, palpitations and leg swelling.   Gastrointestinal:  Negative for abdominal distention, abdominal pain, blood in stool, constipation, diarrhea, nausea and vomiting.   Genitourinary:  Negative for decreased urine volume, difficulty urinating, dysuria, frequency, hematuria and urgency.   Musculoskeletal:  Negative for gait problem and joint swelling.   Skin:  Negative for color change, pallor and rash.    Neurological:  Negative for dizziness, tremors, weakness, light-headedness, numbness and headaches.   Hematological:  Does not bruise/bleed easily.   Psychiatric/Behavioral:  Negative for agitation, behavioral problems, confusion and sleep disturbance.        Objective   Physical Exam  Constitutional:       General: He is awake.      Appearance: Normal appearance. He is well-developed.   HENT:      Head: Normocephalic and atraumatic.      Right Ear: Hearing normal.      Left Ear: Hearing normal.      Nose: Nose normal.      Mouth/Throat:      Lips: Pink.      Mouth: Mucous membranes are moist.   Eyes:      General: Lids are normal.      Extraocular Movements: Extraocular movements intact.      Conjunctiva/sclera: Conjunctivae normal.      Pupils: Pupils are equal, round, and reactive to light.   Cardiovascular:      Rate and Rhythm: Normal rate and regular rhythm.      Pulses: Normal pulses.      Heart sounds: Normal heart sounds.   Pulmonary:      Effort: Pulmonary effort is normal.      Breath sounds: Normal breath sounds.   Abdominal:      General: Abdomen is flat. Bowel sounds are normal.      Palpations: Abdomen is soft.   Musculoskeletal:      Cervical back: Normal range of motion and neck supple.   Feet:      Right foot:      Skin integrity: Skin integrity normal.      Left foot:      Skin integrity: Skin integrity normal.   Skin:     General: Skin is warm.   Neurological:      General: No focal deficit present.      Mental Status: He is alert and oriented to person, place, and time.      Cranial Nerves: Cranial nerves 2-12 are intact.      Sensory: Sensation is intact.      Motor: Motor function is intact.      Coordination: Coordination is intact.      Gait: Gait is intact.   Psychiatric:         Attention and Perception: Attention and perception normal.         Mood and Affect: Mood normal.         Speech: Speech normal.         Behavior: Behavior is cooperative.         Thought Content: Thought content  normal.         Cognition and Memory: Cognition normal.         Judgment: Judgment normal.         Current Outpatient Medications   Medication Sig Dispense Refill    buPROPion XL (Wellbutrin XL) 300 mg 24 hr tablet Take 1 tablet (300 mg) by mouth once daily in the morning. Do not crush, chew, or split. 90 tablet 1     No current facility-administered medications for this visit.     LABS:   Lab Results   Component Value Date    ALBUMIN 4.9 10/31/2024    BILITOT 1.1 10/31/2024    BILIDIR 0.2 12/29/2020    ALKPHOS 55 10/31/2024    ALT 24 10/31/2024    AST 16 10/31/2024    PROT 7.1 10/31/2024      Lab Results   Component Value Date    WBC 7.0 10/31/2024    HGB 13.9 10/31/2024    HCT 42.5 10/31/2024    MCV 91 10/31/2024     10/31/2024         Assessment/Plan   Problem List Items Addressed This Visit             ICD-10-CM    Splenomegaly - Primary R16.1     Splenomegaly etiology unclear and is not concerning.  However, some of his symptoms appear viral in nature and perhaps this could have caused borderline splenomegaly (ie EBV).  Will update pt with results.         Relevant Orders    Liver Elastography (Fibroscan)    Michael-Barr Virus Antibody Panel (VCA IgG/IgM, EA IgG, NA IgG) (Completed)    IGNACIO with Reflex to BRAD (Completed)    Anti-Mitochondrial Antibody (Completed)    Anti-Smooth Muscle Antibody (Completed)    CBC and Auto Differential (Completed)    Comprehensive Metabolic Panel (Completed)    Ferritin (Completed)    Iron and TIBC (Completed)    Hemochromatosis Mutation Analysis (Completed)    Immunoglobulins (IgG, IgA, IgM) (Completed)    Liver/Kidney Microsome Type 1 Antibodies, Serum (Completed)    CMV IgG, IgM (Completed)    Hepatomegaly R16.0     34 year old with borderline hepatomegaly with normal liver tests.   Suspected to be secondary to fatty liver.   Will get fibroscan and do additional liver disease screening to rule out other possible liver disease.  If fibroscan shows fatty liver, discussed  management strategies including diet and exercise.    Exercise/Activity  Vigorous physical activity like brisk walking or structured gym exercises 3 times a week for 30 minutes at minimum.    Resistance training to build muscle mass which will aid in weight loss.  Swimming, water aerobics are excellent forms of exercises that are easy on joints.    Exercise for 30 minutes or more at least 3 times a week  Aim to lose 7-10% of your total body weight over 6-12 months  Diet  Avoid/Limit simple carbs including simple sugars  Avoid eating foods that are rich in sugar in excess such as high sugar content fruits (pineapple, kennedi...) and desserts, cakes and pies  Eat more good foods that are rich in good fat such as cold water fish (salmon, swordfish...) as well as avocados and peanut butter in moderation  Snack on nuts that are high in protein and good oils such as walnuts, almonds.   Eat a mediteranean diet which is rich in grilled lean meats, high protein beans and legumes and olive oil.                      Relevant Orders    Liver Elastography (Fibroscan)    Michael-Barr Virus Antibody Panel (VCA IgG/IgM, EA IgG, NA IgG) (Completed)    IGNACIO with Reflex to BRAD (Completed)    Anti-Mitochondrial Antibody (Completed)    Anti-Smooth Muscle Antibody (Completed)    CBC and Auto Differential (Completed)    Comprehensive Metabolic Panel (Completed)    Ferritin (Completed)    Iron and TIBC (Completed)    Hemochromatosis Mutation Analysis (Completed)    Immunoglobulins (IgG, IgA, IgM) (Completed)    Liver/Kidney Microsome Type 1 Antibodies, Serum (Completed)    CMV IgG, IgM (Completed)            TAINA Escobar 11/05/24 3:52 PM

## 2024-11-01 LAB
ALBUMIN SERPL BCP-MCNC: 4.9 G/DL (ref 3.4–5)
ALP SERPL-CCNC: 55 U/L (ref 33–120)
ALT SERPL W P-5'-P-CCNC: 24 U/L (ref 10–52)
ANA SER QL HEP2 SUBST: NEGATIVE
ANION GAP SERPL CALC-SCNC: 15 MMOL/L (ref 10–20)
AST SERPL W P-5'-P-CCNC: 16 U/L (ref 9–39)
BASOPHILS # BLD AUTO: 0.03 X10*3/UL (ref 0–0.1)
BASOPHILS NFR BLD AUTO: 0.4 %
BILIRUB SERPL-MCNC: 1.1 MG/DL (ref 0–1.2)
BUN SERPL-MCNC: 25 MG/DL (ref 6–23)
CALCIUM SERPL-MCNC: 9.2 MG/DL (ref 8.6–10.6)
CHLORIDE SERPL-SCNC: 105 MMOL/L (ref 98–107)
CMV IGG AVIDITY SERPL IA-RTO: NONREACTIVE %
CO2 SERPL-SCNC: 26 MMOL/L (ref 21–32)
CREAT SERPL-MCNC: 0.99 MG/DL (ref 0.5–1.3)
EBV EA IGG SER QL: NEGATIVE
EBV NA AB SER QL: POSITIVE
EBV VCA IGG SER IA-ACNC: POSITIVE
EBV VCA IGM SER IA-ACNC: NEGATIVE
EGFRCR SERPLBLD CKD-EPI 2021: >90 ML/MIN/1.73M*2
EOSINOPHIL # BLD AUTO: 0.21 X10*3/UL (ref 0–0.7)
EOSINOPHIL NFR BLD AUTO: 3 %
ERYTHROCYTE [DISTWIDTH] IN BLOOD BY AUTOMATED COUNT: 12.3 % (ref 11.5–14.5)
FERRITIN SERPL-MCNC: 101 NG/ML (ref 20–300)
GLUCOSE SERPL-MCNC: 94 MG/DL (ref 74–99)
HCT VFR BLD AUTO: 42.5 % (ref 41–52)
HGB BLD-MCNC: 13.9 G/DL (ref 13.5–17.5)
IGA SERPL-MCNC: 171 MG/DL (ref 70–400)
IGG SERPL-MCNC: 942 MG/DL (ref 700–1600)
IGM SERPL-MCNC: 80 MG/DL (ref 40–230)
IMM GRANULOCYTES # BLD AUTO: 0.02 X10*3/UL (ref 0–0.7)
IMM GRANULOCYTES NFR BLD AUTO: 0.3 % (ref 0–0.9)
IRON SATN MFR SERPL: 20 % (ref 25–45)
IRON SERPL-MCNC: 70 UG/DL (ref 35–150)
LYMPHOCYTES # BLD AUTO: 1.78 X10*3/UL (ref 1.2–4.8)
LYMPHOCYTES NFR BLD AUTO: 25.5 %
MCH RBC QN AUTO: 29.6 PG (ref 26–34)
MCHC RBC AUTO-ENTMCNC: 32.7 G/DL (ref 32–36)
MCV RBC AUTO: 91 FL (ref 80–100)
MITOCHONDRIA AB SER QL IF: NEGATIVE
MONOCYTES # BLD AUTO: 0.56 X10*3/UL (ref 0.1–1)
MONOCYTES NFR BLD AUTO: 8 %
NEUTROPHILS # BLD AUTO: 4.39 X10*3/UL (ref 1.2–7.7)
NEUTROPHILS NFR BLD AUTO: 62.8 %
NRBC BLD-RTO: 0 /100 WBCS (ref 0–0)
PLATELET # BLD AUTO: 201 X10*3/UL (ref 150–450)
POTASSIUM SERPL-SCNC: 4.7 MMOL/L (ref 3.5–5.3)
PROT SERPL-MCNC: 7.1 G/DL (ref 6.4–8.2)
RBC # BLD AUTO: 4.69 X10*6/UL (ref 4.5–5.9)
SMOOTH MUSCLE AB SER QL IF: NEGATIVE
SODIUM SERPL-SCNC: 141 MMOL/L (ref 136–145)
TIBC SERPL-MCNC: 354 UG/DL (ref 240–445)
UIBC SERPL-MCNC: 284 UG/DL (ref 110–370)
WBC # BLD AUTO: 7 X10*3/UL (ref 4.4–11.3)

## 2024-11-02 LAB — CMV IGM SERPL-ACNC: <8 AU/ML

## 2024-11-03 LAB — LKM AB TITR SER IF: NORMAL {TITER}

## 2024-11-04 LAB
ELECTRONICALLY SIGNED BY: ABNORMAL
HFE GENE MUT TESTED BLD/T: ABNORMAL
HFE P.C282Y BLD/T QL: ABNORMAL
HFE P.H63D BLD/T QL: NORMAL

## 2024-11-05 PROBLEM — R16.1 SPLENOMEGALY: Status: ACTIVE | Noted: 2024-11-05

## 2024-11-05 PROBLEM — R16.0 HEPATOMEGALY: Status: ACTIVE | Noted: 2024-11-05

## 2024-11-05 ASSESSMENT — ENCOUNTER SYMPTOMS
DIFFICULTY URINATING: 0
WEAKNESS: 0
SLEEP DISTURBANCE: 0
JOINT SWELLING: 0
CHILLS: 0
DYSURIA: 0
WHEEZING: 0
DIZZINESS: 0
TROUBLE SWALLOWING: 0
VOICE CHANGE: 0
LIGHT-HEADEDNESS: 0
APPETITE CHANGE: 0
AGITATION: 0
TREMORS: 0
COLOR CHANGE: 0
FREQUENCY: 0
DIARRHEA: 0
VOMITING: 0
SHORTNESS OF BREATH: 0
NAUSEA: 0
CONSTIPATION: 0
UNEXPECTED WEIGHT CHANGE: 0
ABDOMINAL DISTENTION: 0
PALPITATIONS: 0
ABDOMINAL PAIN: 0
CONFUSION: 0
COUGH: 0
BRUISES/BLEEDS EASILY: 0
HEMATURIA: 0
FEVER: 0
BLOOD IN STOOL: 0
HEADACHES: 0
NUMBNESS: 0

## 2024-11-05 NOTE — ASSESSMENT & PLAN NOTE
34 year old with borderline hepatomegaly with normal liver tests.   Suspected to be secondary to fatty liver.   Will get fibroscan and do additional liver disease screening to rule out other possible liver disease.  If fibroscan shows fatty liver, discussed management strategies including diet and exercise.    Exercise/Activity  Vigorous physical activity like brisk walking or structured gym exercises 3 times a week for 30 minutes at minimum.    Resistance training to build muscle mass which will aid in weight loss.  Swimming, water aerobics are excellent forms of exercises that are easy on joints.    Exercise for 30 minutes or more at least 3 times a week  Aim to lose 7-10% of your total body weight over 6-12 months  Diet  Avoid/Limit simple carbs including simple sugars  Avoid eating foods that are rich in sugar in excess such as high sugar content fruits (pineapple, kennedi...) and desserts, cakes and pies  Eat more good foods that are rich in good fat such as cold water fish (salmon, swordfish...) as well as avocados and peanut butter in moderation  Snack on nuts that are high in protein and good oils such as walnuts, almonds.   Eat a mediteranean diet which is rich in grilled lean meats, high protein beans and legumes and olive oil.

## 2024-11-05 NOTE — ASSESSMENT & PLAN NOTE
Splenomegaly etiology unclear and is not concerning.  However, some of his symptoms appear viral in nature and perhaps this could have caused borderline splenomegaly (ie EBV).  Will update pt with results.

## 2024-11-14 ENCOUNTER — APPOINTMENT (OUTPATIENT)
Dept: GASTROENTEROLOGY | Facility: CLINIC | Age: 34
End: 2024-11-14
Payer: COMMERCIAL

## 2024-11-22 ENCOUNTER — CLINICAL SUPPORT (OUTPATIENT)
Dept: GASTROENTEROLOGY | Facility: HOSPITAL | Age: 34
End: 2024-11-22
Payer: COMMERCIAL

## 2024-11-22 DIAGNOSIS — R16.1 SPLENOMEGALY: ICD-10-CM

## 2024-11-22 DIAGNOSIS — R16.0 HEPATOMEGALY: ICD-10-CM

## 2025-01-06 NOTE — PROGRESS NOTES
Patient ID: Sukhdeep Byrd is a 34 y.o. male.  Referring Physician: No referring provider defined for this encounter.  Primary Care Provider: Naseem Aguila MD      Subjective    HPI  Mr. Sukhdeep Byrd is a 35 y/o M presenting for initial consultation for splenomegaly. Iron saturation mildly low 20%.    Blood work 10/31/24 with no cytopenias and normal platelet count.    Patient was in hospital for abdominal pain 9/2024 and at that time CT of the abdomen showed a large right mesenteric lymph node concerning for lymphadenitis. At that time spleen was mildly enlarged 14.2 cm along with mild hepatomegaly. No focal lesions seen on the spleen. He was seen by hepatology 12/2024. Work- up including fibroscan 11/25/24 showed hepatocyte steatosis and all other work-up negative. EBV studies showed recent infection.    Patient reports he has been sick several times since July and really hasn't felt ok since then wither. He has constant chills but no fevers. He has arthralgias. He reports drenching night sweats to the point of having to change his bedding at least once week. He has lost 40 lbs in the last year but he was trying with increased exercise and diet changes. His mother had leukemia and is well today and he has 2 sisters, one with RA and one healthy. He was a smoker of 2 packs a day but quit 10 years ago. He smokes marijuana almost daily. He does not drink alcohol. He reports the feeling of swelling the morning after he has a severe night sweat. He has abdominal pain and nausea. His bowel movements are mostly normal and regular with some intermittent diarrhea and no constipation. He denies any severe childhood sickness or prolonged hospitalization stays. No other major complaints at this time.     Patient's past medical history, surgical history, family history and social history reviewed.    Objective     Vitals:    01/07/25 1509   BP: 143/87   Pulse: 93   Resp: 16   Temp: 36.3 °C (97.3 °F)   SpO2: 98%     Review  of Systems:   Review of Systems:    Positive per HPI, otherwise negative.   Physical Exam  Gen: appears well in clinic, NAD  HEENT: atraumatic head, normocephalic, EOMI, conjunctiva normal  LUNG: no increased WOB, CTAB  CV: No JVD. RRR  GI: soft, NT, ND  LE: no LE edema  Skin: no obvious rashes or lesions on visible skin  Neuro: interactive, no focal deficits noted  Psych: normal mood and affect  Performance Status:  Symptomatic; fully ambulatory    Gen: appears well in clinic, NAD  HEENT: atraumatic head, normocephalic, EOMI, conjunctiva normal  LUNG: no increased WOB, CTAB  CV: No JVD. RRR  GI: soft, NT, ND  LE: no LE edema  Skin: no obvious rashes or lesions on visible skin  Neuro: interactive, no focal deficits noted  Psych: normal mood and affect  Assessment/Plan    Splenomegaly  Night sweats  Abdominal pain  - Today we discussed given persistent abdominal pain and discomfort, drenching night sweats at least once weekly where he has to change his bedding, and a 40 lb weight loss which he states he has been trying with increased exercise and diet changes I do have concern for an underlying lymphoproliferative disorder.  - He is having pain and nausea today.  - RX sent for 15 tablets of tramadol, OARRS reviewed and urine drug screen will be completed today.  - RX sent for Zofran to use for nausea.   - To further evaluate symptoms we will plan for PET CT.  - Will plan to check LDH, CBC, CMP, SPEP, and serum free light chains today.  - Given his arthralgias we will check Rheumatoid factor due to his family history of RA.   - RTC with phone visit after results of the PET CT have returned.     RTC with phone visit after PET CT results have returned. This note has been transcribed using a medical scribe and there is a possibility of unintentional typing misprints.    Diagnoses and all orders for this visit:  Night sweats  -     Comprehensive Metabolic Panel; Future  Splenomegaly  -     Referral to Hematology and  Oncology  -     NM PET CT bone skull base to mid thigh; Future  -     Rheumatoid Factor; Future  -     CBC and Auto Differential; Future  -     Lactate dehydrogenase; Future  -     Serum Protein Electrophoresis; Future  -     [place]/Lambda Free Light Chain, Serum; Future  -     ondansetron (Zofran) 8 mg tablet; Take 1 tablet (8 mg) by mouth 2 times a day as needed for nausea for up to 21 days.  -     traMADol (Ultram) 50 mg tablet; Take 1 tablet (50 mg) by mouth once daily for 15 days.  -     Clinic Appointment Request Virtual Est (phone end of day after PET scan); Future  Generalized abdominal pain  -     Drug Screen, Urine With Reflex to Confirmation; Future  -     Clinic Appointment Request Virtual Est (phone end of day after PET scan); Future         Yeny Chawla MD  Hematology/Oncology  [facility]    Scribe Attestation  By signing my name below, IJen Scribe   attest that this documentation has been prepared under the direction and in the presence of Yeny Chawla MD.     Time Spent  Prep time on day of patient encounter: 5 minutes  Time spent directly with patient, family or caregiver: 25 minutes  Additional Time Spent on Patient Care Activities: 5 minutes  Documentation Time: 5 minutes  Other Time Spent: 0 minutes  Total: 40 minutes

## 2025-01-07 ENCOUNTER — LAB (OUTPATIENT)
Dept: LAB | Facility: CLINIC | Age: 35
End: 2025-01-07
Payer: COMMERCIAL

## 2025-01-07 ENCOUNTER — OFFICE VISIT (OUTPATIENT)
Dept: HEMATOLOGY/ONCOLOGY | Facility: CLINIC | Age: 35
End: 2025-01-07
Payer: COMMERCIAL

## 2025-01-07 VITALS
RESPIRATION RATE: 16 BRPM | TEMPERATURE: 97.3 F | WEIGHT: 226.41 LBS | BODY MASS INDEX: 31.58 KG/M2 | OXYGEN SATURATION: 98 % | DIASTOLIC BLOOD PRESSURE: 87 MMHG | SYSTOLIC BLOOD PRESSURE: 143 MMHG | HEART RATE: 93 BPM

## 2025-01-07 DIAGNOSIS — R10.84 GENERALIZED ABDOMINAL PAIN: ICD-10-CM

## 2025-01-07 DIAGNOSIS — R16.1 SPLENOMEGALY: ICD-10-CM

## 2025-01-07 DIAGNOSIS — R61 NIGHT SWEATS: ICD-10-CM

## 2025-01-07 DIAGNOSIS — R61 NIGHT SWEATS: Primary | ICD-10-CM

## 2025-01-07 LAB
ALBUMIN SERPL BCP-MCNC: 4.8 G/DL (ref 3.4–5)
ALP SERPL-CCNC: 59 U/L (ref 33–120)
ALT SERPL W P-5'-P-CCNC: 20 U/L (ref 10–52)
AMPHETAMINES UR QL SCN: ABNORMAL
ANION GAP SERPL CALC-SCNC: 11 MMOL/L (ref 10–20)
AST SERPL W P-5'-P-CCNC: 14 U/L (ref 9–39)
BARBITURATES UR QL SCN: ABNORMAL
BASOPHILS # BLD AUTO: 0.01 X10*3/UL (ref 0–0.1)
BASOPHILS NFR BLD AUTO: 0.1 %
BENZODIAZ UR QL SCN: ABNORMAL
BILIRUB SERPL-MCNC: 0.7 MG/DL (ref 0–1.2)
BUN SERPL-MCNC: 21 MG/DL (ref 6–23)
BZE UR QL SCN: ABNORMAL
CALCIUM SERPL-MCNC: 9.6 MG/DL (ref 8.6–10.3)
CANNABINOIDS UR QL SCN: ABNORMAL
CHLORIDE SERPL-SCNC: 106 MMOL/L (ref 98–107)
CO2 SERPL-SCNC: 27 MMOL/L (ref 21–32)
CREAT SERPL-MCNC: 0.7 MG/DL (ref 0.5–1.3)
EGFRCR SERPLBLD CKD-EPI 2021: >90 ML/MIN/1.73M*2
EOSINOPHIL # BLD AUTO: 0.25 X10*3/UL (ref 0–0.7)
EOSINOPHIL NFR BLD AUTO: 3.7 %
ERYTHROCYTE [DISTWIDTH] IN BLOOD BY AUTOMATED COUNT: 12 % (ref 11.5–14.5)
FENTANYL+NORFENTANYL UR QL SCN: ABNORMAL
GLUCOSE SERPL-MCNC: 99 MG/DL (ref 74–99)
HCT VFR BLD AUTO: 41.2 % (ref 41–52)
HGB BLD-MCNC: 14.3 G/DL (ref 13.5–17.5)
IMM GRANULOCYTES # BLD AUTO: 0.02 X10*3/UL (ref 0–0.7)
IMM GRANULOCYTES NFR BLD AUTO: 0.3 % (ref 0–0.9)
LDH SERPL L TO P-CCNC: 117 U/L (ref 84–246)
LYMPHOCYTES # BLD AUTO: 2.43 X10*3/UL (ref 1.2–4.8)
LYMPHOCYTES NFR BLD AUTO: 36 %
MCH RBC QN AUTO: 30.3 PG (ref 26–34)
MCHC RBC AUTO-ENTMCNC: 34.7 G/DL (ref 32–36)
MCV RBC AUTO: 87 FL (ref 80–100)
METHADONE UR QL SCN: ABNORMAL
MONOCYTES # BLD AUTO: 0.54 X10*3/UL (ref 0.1–1)
MONOCYTES NFR BLD AUTO: 8 %
NEUTROPHILS # BLD AUTO: 3.5 X10*3/UL (ref 1.2–7.7)
NEUTROPHILS NFR BLD AUTO: 51.9 %
OPIATES UR QL SCN: ABNORMAL
OXYCODONE+OXYMORPHONE UR QL SCN: ABNORMAL
PCP UR QL SCN: ABNORMAL
PLATELET # BLD AUTO: 207 X10*3/UL (ref 150–450)
POTASSIUM SERPL-SCNC: 3.8 MMOL/L (ref 3.5–5.3)
PROT SERPL-MCNC: 7.3 G/DL (ref 6.4–8.2)
RBC # BLD AUTO: 4.72 X10*6/UL (ref 4.5–5.9)
SODIUM SERPL-SCNC: 140 MMOL/L (ref 136–145)
WBC # BLD AUTO: 6.8 X10*3/UL (ref 4.4–11.3)

## 2025-01-07 PROCEDURE — 36415 COLL VENOUS BLD VENIPUNCTURE: CPT

## 2025-01-07 PROCEDURE — 86431 RHEUMATOID FACTOR QUANT: CPT

## 2025-01-07 PROCEDURE — 84165 PROTEIN E-PHORESIS SERUM: CPT

## 2025-01-07 PROCEDURE — 80349 CANNABINOIDS NATURAL: CPT

## 2025-01-07 PROCEDURE — 84075 ASSAY ALKALINE PHOSPHATASE: CPT

## 2025-01-07 PROCEDURE — 83615 LACTATE (LD) (LDH) ENZYME: CPT

## 2025-01-07 PROCEDURE — 80307 DRUG TEST PRSMV CHEM ANLYZR: CPT

## 2025-01-07 PROCEDURE — 84155 ASSAY OF PROTEIN SERUM: CPT | Mod: 59

## 2025-01-07 PROCEDURE — 85025 COMPLETE CBC W/AUTO DIFF WBC: CPT

## 2025-01-07 PROCEDURE — 83521 IG LIGHT CHAINS FREE EACH: CPT

## 2025-01-07 PROCEDURE — 99204 OFFICE O/P NEW MOD 45 MIN: CPT | Performed by: INTERNAL MEDICINE

## 2025-01-07 PROCEDURE — 99214 OFFICE O/P EST MOD 30 MIN: CPT | Mod: 25 | Performed by: INTERNAL MEDICINE

## 2025-01-07 RX ORDER — TRAMADOL HYDROCHLORIDE 50 MG/1
50 TABLET ORAL DAILY
Qty: 15 TABLET | Refills: 0 | Status: SHIPPED | OUTPATIENT
Start: 2025-01-07 | End: 2025-01-22

## 2025-01-07 RX ORDER — ONDANSETRON HYDROCHLORIDE 8 MG/1
8 TABLET, FILM COATED ORAL 2 TIMES DAILY PRN
Qty: 30 TABLET | Refills: 1 | Status: SHIPPED | OUTPATIENT
Start: 2025-01-07 | End: 2025-01-28

## 2025-01-07 ASSESSMENT — PAIN SCALES - GENERAL: PAINLEVEL_OUTOF10: 6

## 2025-01-08 LAB
KAPPA LC SERPL-MCNC: 1.67 MG/DL (ref 0.33–1.94)
KAPPA LC/LAMBDA SER: 1.94 {RATIO} (ref 0.26–1.65)
LAMBDA LC SERPL-MCNC: 0.86 MG/DL (ref 0.57–2.63)
PROT SERPL-MCNC: 7.3 G/DL (ref 6.4–8.2)
RHEUMATOID FACT SER NEPH-ACNC: <10 IU/ML (ref 0–15)

## 2025-01-09 LAB
ALBUMIN: 4.6 G/DL (ref 3.4–5)
ALPHA 1 GLOBULIN: 0.3 G/DL (ref 0.2–0.6)
ALPHA 2 GLOBULIN: 0.6 G/DL (ref 0.4–1.1)
BETA GLOBULIN: 0.8 G/DL (ref 0.5–1.2)
GAMMA GLOBULIN: 0.9 G/DL (ref 0.5–1.4)
PATH REVIEW-SERUM PROTEIN ELECTROPHORESIS: NORMAL
PROTEIN ELECTROPHORESIS COMMENT: NORMAL

## 2025-01-11 LAB — CARBOXYTHC UR-MCNC: >500 NG/ML

## 2025-01-17 ENCOUNTER — APPOINTMENT (OUTPATIENT)
Dept: RADIOLOGY | Facility: CLINIC | Age: 35
End: 2025-01-17
Payer: COMMERCIAL

## 2025-01-21 ENCOUNTER — HOSPITAL ENCOUNTER (OUTPATIENT)
Dept: RADIOLOGY | Facility: CLINIC | Age: 35
Discharge: HOME | End: 2025-01-21
Payer: COMMERCIAL

## 2025-01-21 DIAGNOSIS — R16.1 SPLENOMEGALY: ICD-10-CM

## 2025-01-21 PROCEDURE — 78815 PET IMAGE W/CT SKULL-THIGH: CPT | Mod: PET TUMOR INIT TX STRAT | Performed by: STUDENT IN AN ORGANIZED HEALTH CARE EDUCATION/TRAINING PROGRAM

## 2025-01-21 PROCEDURE — 3430000001 HC RX 343 DIAGNOSTIC RADIOPHARMACEUTICALS: Performed by: INTERNAL MEDICINE

## 2025-01-21 PROCEDURE — A9552 F18 FDG: HCPCS | Performed by: INTERNAL MEDICINE

## 2025-01-21 PROCEDURE — 78815 PET IMAGE W/CT SKULL-THIGH: CPT | Mod: PI

## 2025-01-21 RX ORDER — FLUDEOXYGLUCOSE F 18 200 MCI/ML
12.7 INJECTION, SOLUTION INTRAVENOUS
Status: COMPLETED | OUTPATIENT
Start: 2025-01-21 | End: 2025-01-21

## 2025-01-21 RX ADMIN — FLUDEOXYGLUCOSE F 18 12.7 MILLICURIE: 200 INJECTION, SOLUTION INTRAVENOUS at 13:05

## 2025-01-23 NOTE — PROGRESS NOTES
Consent:  Verbal consent was requested and obtained from patient on this date for a telehealth visit.    Patient ID: Sukhdeep Byrd is a 34 y.o. male.    Subjective    HPI  A telephone visit (audio only) between the patient at home and the provider at [facility] [place] was utilized to provide this telehealth service.     Mr. Sukhdeep Byrd is a 35 y/o M presenting for follow-up for splenomegaly.    PET CT 1/21/25 shows no FGD avid disease and spleen enlarged at 13.9 cm with no lesions.    Blood work including kappa lambda light chains ratio 1.94, SPEP unremarkable, LDH normal, and chemistry panel unremarkable.    Patient reports his symptoms have not really changed and that he continues to have persistent diarrhea. No other major complaints at this time.     Patient's past medical history, surgical history, family history and social history reviewed.    Objective      Review of Systems:   Review of Systems:    Positive per HPI, otherwise negative.     Performance Status:  Symptomatic; fully ambulatory    Labs/Imaging/Pathology: personally reviewed reports and images in Epic electronic medical record system. Pertinent results as it related to the plan represented in below in assessment and plan.   Assessment/Plan    Splenomegaly  Night sweats  Abdominal pain  - Today we discussed given persistent abdominal pain and discomfort, drenching night sweats at least once weekly where he has to change his bedding, and a 40 lb weight loss which he states he has been trying with increased exercise and diet changes I do have concern for an underlying lymphoproliferative disorder.  - He is having pain and nausea today.  - RX sent for 15 tablets of tramadol, OARRS reviewed and urine drug screen will be completed today.  - RX sent for Zofran to use for nausea.   - To further evaluate symptoms we will plan for PET CT.  - Will plan to check LDH, CBC, CMP, SPEP, and serum free light chains today.  - Given his  arthralgias we will check Rheumatoid factor due to his family history of RA.   - RTC with phone visit after results of the PET CT have returned.     1/24/25: Telephone visit  - PET CT unremarkable.   - Blood work shows no evidence of MPN or plasma cell dyscrasia.  - At this point no further hematologic or oncologic work-up is needed.  - Given his persistent anemia we will send a referral to gastroenterology today.  - RTC PRN.    RTC PRN. This note has been transcribed using a medical scribe and there is a possibility of unintentional typing misprints.     Diagnoses and all orders for this visit:  Diarrhea, unspecified type  Splenomegaly  -     Clinic Appointment Request Virtual Est (phone end of day after PET scan)  Generalized abdominal pain  -     Clinic Appointment Request Virtual Est (phone end of day after PET scan)  -     Referral to Gastroenterology; Future       Yeny Chawla MD  Hematology/Oncology  [facility]    Scribe Attestation  By signing my name below, IJen Scribe   attest that this documentation has been prepared under the direction and in the presence of Yeny Chawla MD.     20 min spent on this encounter

## 2025-01-24 ENCOUNTER — TELEMEDICINE (OUTPATIENT)
Dept: HEMATOLOGY/ONCOLOGY | Facility: CLINIC | Age: 35
End: 2025-01-24
Payer: COMMERCIAL

## 2025-01-24 DIAGNOSIS — R19.7 DIARRHEA, UNSPECIFIED TYPE: Primary | ICD-10-CM

## 2025-01-24 DIAGNOSIS — R10.84 GENERALIZED ABDOMINAL PAIN: ICD-10-CM

## 2025-01-24 DIAGNOSIS — R16.1 SPLENOMEGALY: ICD-10-CM

## 2025-01-24 PROCEDURE — 99213 OFFICE O/P EST LOW 20 MIN: CPT | Performed by: INTERNAL MEDICINE

## 2025-02-10 ENCOUNTER — APPOINTMENT (OUTPATIENT)
Dept: UROLOGY | Facility: CLINIC | Age: 35
End: 2025-02-10
Payer: COMMERCIAL

## 2025-02-12 ENCOUNTER — TELEMEDICINE (OUTPATIENT)
Dept: UROLOGY | Facility: CLINIC | Age: 35
End: 2025-02-12
Payer: COMMERCIAL

## 2025-02-12 DIAGNOSIS — Z30.09 VASECTOMY EVALUATION: Primary | ICD-10-CM

## 2025-02-12 PROCEDURE — 99203 OFFICE O/P NEW LOW 30 MIN: CPT | Performed by: UROLOGY

## 2025-02-12 NOTE — PROGRESS NOTES
CHIEF COMPLAINT:  1. Vasectomy consultation    HPI TODAY: 02/12/2025:  Rochelle presents for vasectomy consultation. Has two children ages 9 and 5.  for 10 years. Wife supportive of vasectomy. Reports chronic left hydroureter since 02/2017 without significant parenchymal loss or hydronephrosis. Prior scrotal ultrasound showed tiny epididymal cysts.    Left Hydroureter:  - Chronic condition since 02/2017  - No significant parenchymal loss  - No hydronephrosis  - Asymptomatic  - Normal kidney function    PAST MEDICAL HISTORY:  - GERD  - IBS  - Hepatomegaly  - Low back pain  - Left hydroureter  - No prior urologic surgeries  - No anticoagulation    SOCIAL:  - Works at [employer] in [place]  -  10 years  - Two children (ages 9 and 5)    REVIEW OF SYSTEMS:  A tailored review of systems was performed and all pertinent positives and negatives are listed in the HPI.    PHYSICAL EXAMINATION:  Gen: NAD  Pulm: No increased WOB on RA  Cards: WWP    ASSESSMENT/PLAN:    Vasectomy Consultation (Z30.2)  - Assessment: Desires permanent sterilization. Good candidate for vasectomy.  - Plan: Schedule for vasectomy at [facility] on Friday afternoon. Instructions provided for:  - Scrotal shaving  - Compression shorts/jockstrap requirement  - Optional Ativan pre-procedure, he declined  - Post-procedure care including ice, rest, NSAIDs  - Activity restrictions for 1 week  - Sexual activity restrictions  - Semen analysis in 2-3 months after ~20 ejaculations  - Counseling: Risks, benefits, and alternatives were discussed including but not limited to permanent nature of procedure, limited success of reversal options, post-vasectomy pain syndrome, hematoma, infection, failure rate. Discussed IVF and sperm retrieval options. Recovery expectations and post-operative instructions reviewed.    The patient provided verbal consent for the audio recording of today's encounter using AI.

## 2025-03-17 ENCOUNTER — APPOINTMENT (OUTPATIENT)
Dept: PRIMARY CARE | Facility: CLINIC | Age: 35
End: 2025-03-17
Payer: COMMERCIAL

## 2025-03-19 ENCOUNTER — APPOINTMENT (OUTPATIENT)
Dept: PRIMARY CARE | Facility: CLINIC | Age: 35
End: 2025-03-19
Payer: COMMERCIAL

## 2025-03-19 VITALS
TEMPERATURE: 97.9 F | OXYGEN SATURATION: 98 % | BODY MASS INDEX: 33.23 KG/M2 | SYSTOLIC BLOOD PRESSURE: 130 MMHG | DIASTOLIC BLOOD PRESSURE: 86 MMHG | RESPIRATION RATE: 18 BRPM | HEIGHT: 71 IN | WEIGHT: 237.37 LBS | HEART RATE: 81 BPM

## 2025-03-19 DIAGNOSIS — E66.811 CLASS 1 OBESITY DUE TO EXCESS CALORIES WITHOUT SERIOUS COMORBIDITY WITH BODY MASS INDEX (BMI) OF 33.0 TO 33.9 IN ADULT: ICD-10-CM

## 2025-03-19 DIAGNOSIS — E66.09 CLASS 1 OBESITY DUE TO EXCESS CALORIES WITHOUT SERIOUS COMORBIDITY WITH BODY MASS INDEX (BMI) OF 33.0 TO 33.9 IN ADULT: ICD-10-CM

## 2025-03-19 DIAGNOSIS — K58.9 IRRITABLE BOWEL SYNDROME, UNSPECIFIED TYPE: Primary | ICD-10-CM

## 2025-03-19 DIAGNOSIS — R16.0 ENLARGED LIVER: ICD-10-CM

## 2025-03-19 PROCEDURE — 3008F BODY MASS INDEX DOCD: CPT | Performed by: FAMILY MEDICINE

## 2025-03-19 PROCEDURE — 1036F TOBACCO NON-USER: CPT | Performed by: FAMILY MEDICINE

## 2025-03-19 PROCEDURE — 99213 OFFICE O/P EST LOW 20 MIN: CPT | Performed by: FAMILY MEDICINE

## 2025-03-19 RX ORDER — SEMAGLUTIDE 0.25 MG/.5ML
0.25 INJECTION, SOLUTION SUBCUTANEOUS WEEKLY
Qty: 2 ML | Refills: 2 | Status: SHIPPED | OUTPATIENT
Start: 2025-03-19 | End: 2025-06-05

## 2025-03-19 ASSESSMENT — PATIENT HEALTH QUESTIONNAIRE - PHQ9
1. LITTLE INTEREST OR PLEASURE IN DOING THINGS: NOT AT ALL
2. FEELING DOWN, DEPRESSED OR HOPELESS: NOT AT ALL
SUM OF ALL RESPONSES TO PHQ9 QUESTIONS 1 AND 2: 0

## 2025-03-19 NOTE — PROGRESS NOTES
"Subjective   Patient ID: Sukhdeep Byrd is a 34 y.o. male who presents for Follow-up, Anxiety, and Weight Loss.  HPI  Anxiety follow up  Isn't taking anything  Working well    100% effective   Denies any side effects   Last took    Patient would like to discuss weight lost.  Patient states wondering if he can get on Wegovy   Taking current medications which were reviewed.  Problem list discussed.    Overall doing well.  Eating okay.  Staying active.    Has no other new problem /question.     ROS  Constitutional- No activity change. No appetite change.  Eyes- Denies vision changes.  Respiratory- No shortness of breath.  Cardiovascular- No palpitations. No chest pain.  GI- No nausea or vomiting. No diarrhea or constipation. Denies abdominal pain.  Musculoskeletal- Denies joint swelling.  Extremities- No edema.  Neurological- Denies headaches. Denies dizziness.  Skin- No rashes.  Psychiatric/Behavioral- Denies significant anxiety, or depressed mood.     Objective     /86   Pulse 81   Temp 36.6 °C (97.9 °F) (Temporal)   Resp 18   Ht 1.803 m (5' 11\")   Wt 108 kg (237 lb 5.9 oz)   SpO2 98%   BMI 33.11 kg/m²     Allergies   Allergen Reactions    Iodides Unknown    Shellfish Derived Rash and Nausea/vomiting    Vancomycin Rash       Constitutional-- Well-nourished.  No distress  Head- unremarkable.  Eyes- PERRL.  Conjunctiva normal.  Nose- Normal.  No rhinorrhea noted.  Throat- Oropharynx is clear and moist.  Neck- Supple with no thyromegaly.  No significant cervical adenopathy noted.  Pulmonary/Chest- Breath sounds normal with normal effort.  No wheezing.  Heart- Regular rate and rhythm.  No murmur.  Abdomen- Soft and non-tender.  No masses noted.  Musculoskeletal- Normal ROM.  No significant joint swelling  Extremities- No edema.   Neurological- Alert.  No noted deficits.  Skin- Warm.    Psychiatric/Behavioral- Mood and affect normal.  Behavior normal.     Assessment/Plan   1. Irritable bowel syndrome, " unspecified type        2. Class 1 obesity due to excess calories without serious comorbidity with body mass index (BMI) of 33.0 to 33.9 in adult  semaglutide, weight loss, (Wegovy) 0.25 mg/0.5 mL pen injector      3. Enlarged liver               Long talk. Treatment options reviewed.    Reviewed most recent lab work with patient. Advised patient to remain up to date on routine maintenance and health screening.      IBS stable    Treatment options for obesity reviewed in detail.  Risk versus benefits of Wegovy discussed.  His wife is on it and he understands how to use the injector.  Will begin weekly as instructed  Discussed importance of natural sources of nutrition.  Advised patient to consume vegetables, salads, fruits, nuts, and proteins such as fish and chicken.  Discussed portion control.      Discussed the importance of routine stretching and exercise.     Continue and take your medications as prescribed.    Health Maintenance issues discussed.    Importance of healthy diet and regular exercise regimen discussed.    See him back in about 2 months    Follow-up as instructed or sooner if any problems or symptoms do not resolve as expected.      Scribe Attestation  By signing my name below, IKerrie Scribe   attest that this documentation has been prepared under the direction and in the presence of Naseem Aguila MD.

## 2025-03-20 DIAGNOSIS — E66.811 CLASS 1 OBESITY DUE TO EXCESS CALORIES WITHOUT SERIOUS COMORBIDITY WITH BODY MASS INDEX (BMI) OF 33.0 TO 33.9 IN ADULT: Primary | ICD-10-CM

## 2025-03-20 DIAGNOSIS — E66.09 CLASS 1 OBESITY DUE TO EXCESS CALORIES WITHOUT SERIOUS COMORBIDITY WITH BODY MASS INDEX (BMI) OF 33.0 TO 33.9 IN ADULT: Primary | ICD-10-CM

## 2025-03-20 RX ORDER — PHENTERMINE HYDROCHLORIDE 37.5 MG/1
37.5 TABLET ORAL
COMMUNITY
End: 2025-03-20 | Stop reason: SDUPTHER

## 2025-03-20 NOTE — TELEPHONE ENCOUNTER
Please advise  Adipex  pending      Sukhdeep KELLEY Do [id number] Clinical Support Staff (supporting Naseem Aguila MD)Just now (2:45 PM)       Hello, the wegovy won't be covered by my insurance anymore so even with the coupon it's 500 dollars a month.  I was hoping I could just get a prescription for the adipex instead. tried to call twice but was on hold 10 minutes each time and unfortunately couldn't wait while at work.

## 2025-03-21 RX ORDER — PHENTERMINE HYDROCHLORIDE 37.5 MG/1
37.5 TABLET ORAL
Qty: 30 TABLET | Refills: 0 | Status: SHIPPED | OUTPATIENT
Start: 2025-03-21

## 2025-04-09 ENCOUNTER — OFFICE VISIT (OUTPATIENT)
Dept: GASTROENTEROLOGY | Facility: CLINIC | Age: 35
End: 2025-04-09
Payer: COMMERCIAL

## 2025-04-09 VITALS
WEIGHT: 231 LBS | BODY MASS INDEX: 32.22 KG/M2 | DIASTOLIC BLOOD PRESSURE: 83 MMHG | SYSTOLIC BLOOD PRESSURE: 121 MMHG | HEART RATE: 85 BPM | TEMPERATURE: 97.7 F

## 2025-04-09 DIAGNOSIS — G89.29 CHRONIC RLQ PAIN: ICD-10-CM

## 2025-04-09 DIAGNOSIS — R10.12 LUQ PAIN: ICD-10-CM

## 2025-04-09 DIAGNOSIS — R10.31 CHRONIC RLQ PAIN: ICD-10-CM

## 2025-04-09 DIAGNOSIS — R10.84 GENERALIZED ABDOMINAL PAIN: ICD-10-CM

## 2025-04-09 DIAGNOSIS — K21.9 GASTROESOPHAGEAL REFLUX DISEASE, UNSPECIFIED WHETHER ESOPHAGITIS PRESENT: ICD-10-CM

## 2025-04-09 DIAGNOSIS — A09 DIARRHEA OF INFECTIOUS ORIGIN: Primary | ICD-10-CM

## 2025-04-09 PROCEDURE — 99214 OFFICE O/P EST MOD 30 MIN: CPT | Performed by: NURSE PRACTITIONER

## 2025-04-09 RX ORDER — WHEAT DEXTRIN 5 G/7.4 G
POWDER (GRAM) ORAL
Qty: 248 G | Refills: 1 | Status: SHIPPED | OUTPATIENT
Start: 2025-04-09

## 2025-04-09 ASSESSMENT — ENCOUNTER SYMPTOMS
DIAPHORESIS: 0
FEVER: 0
CHILLS: 0
ENDOCRINE NEGATIVE: 1
COUGH: 0
CHEST TIGHTNESS: 0
ALLERGIC/IMMUNOLOGIC NEGATIVE: 1
WHEEZING: 0
HEMATOLOGIC/LYMPHATIC NEGATIVE: 1
CARDIOVASCULAR NEGATIVE: 1
APNEA: 0
NEUROLOGICAL NEGATIVE: 1
STRIDOR: 0
PSYCHIATRIC NEGATIVE: 1
MUSCULOSKELETAL NEGATIVE: 1
EYES NEGATIVE: 1
DEPRESSION: 0
RESPIRATORY NEGATIVE: 1
DIFFICULTY URINATING: 0
FATIGUE: 0
SHORTNESS OF BREATH: 0
ROS GI COMMENTS: SEE HPI

## 2025-04-09 ASSESSMENT — PAIN SCALES - GENERAL: PAINLEVEL_OUTOF10: 0-NO PAIN

## 2025-04-09 NOTE — PROGRESS NOTES
"Subjective   Patient ID: Sukhdeep Byrd is a 34 y.o. male who presents for Abdominal Pain. PMH: Cholecystectomy, GERD    Denies  GLP-1s, blood thinners, pacer/defib, diabetes     Referred to GI for night sweats, generalized abdominal pain and a 40 lb weight loss (intentional)  Started in July, had right sided pain, fevers,weight loss, and CT was normal  He has been to hematology and hepatology    Still has right sided pain, improved, now.  He has a BM \"all the time\". Has diarrhea-3-5 times/watery/loose and he reports this is \"lifetime\". At first he was having constipation.   He denies blood in the stools, nocturnal Bm's    Denies vomiting, +nausea, has left upper quadrant pain (constant and pressure). He denies dysphagia, odynophagia, +pain worse with eating, he reports daily heartburn  \"Popping tums like candy\"    Colonoscopy 2015  EGD around this time    Saw Bonnie Pritchard for splenomegaly    Family hx: Denies a family hx of CRC  M. Grandmother with stomach cancer @ 50s  Uses marijuana     Review of Systems   Constitutional:  Negative for chills, diaphoresis, fatigue and fever.   HENT: Negative.     Eyes: Negative.    Respiratory: Negative.  Negative for apnea, cough, chest tightness, shortness of breath, wheezing and stridor.    Cardiovascular: Negative.    Gastrointestinal:         See HPI    Endocrine: Negative.    Genitourinary: Negative.  Negative for difficulty urinating.   Musculoskeletal: Negative.    Skin: Negative.    Allergic/Immunologic: Negative.    Neurological: Negative.    Hematological: Negative.    Psychiatric/Behavioral: Negative.         Objective   Physical Exam  Constitutional:       Appearance: Normal appearance.   HENT:      Nose: Nose normal.   Eyes:      General: Lids are normal.   Cardiovascular:      Rate and Rhythm: Normal rate and regular rhythm.      Heart sounds: Normal heart sounds.   Pulmonary:      Effort: Pulmonary effort is normal.      Breath sounds: Normal breath sounds. "   Abdominal:      General: Bowel sounds are normal.   Musculoskeletal:         General: Normal range of motion.   Skin:     General: Skin is warm and dry.   Neurological:      Mental Status: He is alert and oriented to person, place, and time.   Psychiatric:         Mood and Affect: Mood normal.         Assessment/Plan   Diagnoses and all orders for this visit:  Diarrhea of infectious origin  Generalized abdominal pain    Referred for diarrhea, RLQ pain, LUQ pain. He underwent a CT AP and found to have splenomegaly. He was seen by Hepatology and thought this was 2/2 viral infection. He did have a CTE which was unremarkable. He has significant amount of night sweats and a h/o anemia (now resolved) and was seen by heme/onc and referred to GI for diarrhea and abdominal pain.      Discussed etiologies of diarrhea including infection,inflammation, dietary, irritable bowel, check fecal calprotectin, complete colonoscopy (also has right lower quadrant pain)    Suspect left/epigastric pain is 2/2 GERD and vomiting from [facility] -will start Omeprazole and complete EGD    THALIA Dumont-CNP 04/09/25 1:01 PM

## 2025-04-10 ENCOUNTER — ANESTHESIA EVENT (OUTPATIENT)
Dept: GASTROENTEROLOGY | Facility: EXTERNAL LOCATION | Age: 35
End: 2025-04-10

## 2025-04-21 ENCOUNTER — ANESTHESIA (OUTPATIENT)
Dept: GASTROENTEROLOGY | Facility: EXTERNAL LOCATION | Age: 35
End: 2025-04-21

## 2025-04-21 ENCOUNTER — APPOINTMENT (OUTPATIENT)
Dept: GASTROENTEROLOGY | Facility: EXTERNAL LOCATION | Age: 35
End: 2025-04-21
Payer: COMMERCIAL

## 2025-04-21 VITALS
TEMPERATURE: 96.8 F | SYSTOLIC BLOOD PRESSURE: 108 MMHG | WEIGHT: 225 LBS | HEIGHT: 71 IN | RESPIRATION RATE: 16 BRPM | OXYGEN SATURATION: 99 % | DIASTOLIC BLOOD PRESSURE: 63 MMHG | HEART RATE: 76 BPM | BODY MASS INDEX: 31.5 KG/M2

## 2025-04-21 DIAGNOSIS — R10.12 LUQ PAIN: ICD-10-CM

## 2025-04-21 DIAGNOSIS — G89.29 CHRONIC RLQ PAIN: ICD-10-CM

## 2025-04-21 DIAGNOSIS — A09 DIARRHEA OF INFECTIOUS ORIGIN: Primary | ICD-10-CM

## 2025-04-21 DIAGNOSIS — K21.9 GASTROESOPHAGEAL REFLUX DISEASE, UNSPECIFIED WHETHER ESOPHAGITIS PRESENT: ICD-10-CM

## 2025-04-21 DIAGNOSIS — R10.31 CHRONIC RLQ PAIN: ICD-10-CM

## 2025-04-21 PROBLEM — Z72.0 VAPES NICOTINE CONTAINING SUBSTANCE: Status: ACTIVE | Noted: 2025-04-21

## 2025-04-21 PROCEDURE — 45380 COLONOSCOPY AND BIOPSY: CPT | Performed by: INTERNAL MEDICINE

## 2025-04-21 PROCEDURE — 43239 EGD BIOPSY SINGLE/MULTIPLE: CPT | Performed by: INTERNAL MEDICINE

## 2025-04-21 RX ORDER — SODIUM CHLORIDE 9 MG/ML
INJECTION, SOLUTION INTRAVENOUS CONTINUOUS PRN
Status: DISCONTINUED | OUTPATIENT
Start: 2025-04-21 | End: 2025-04-21

## 2025-04-21 RX ORDER — PROPOFOL 10 MG/ML
INJECTION, EMULSION INTRAVENOUS AS NEEDED
Status: DISCONTINUED | OUTPATIENT
Start: 2025-04-21 | End: 2025-04-21

## 2025-04-21 RX ORDER — FENTANYL CITRATE 50 UG/ML
INJECTION, SOLUTION INTRAMUSCULAR; INTRAVENOUS AS NEEDED
Status: DISCONTINUED | OUTPATIENT
Start: 2025-04-21 | End: 2025-04-21

## 2025-04-21 RX ORDER — LIDOCAINE HYDROCHLORIDE 20 MG/ML
INJECTION, SOLUTION INFILTRATION; PERINEURAL AS NEEDED
Status: DISCONTINUED | OUTPATIENT
Start: 2025-04-21 | End: 2025-04-21

## 2025-04-21 RX ADMIN — FENTANYL CITRATE 50 MCG: 50 INJECTION, SOLUTION INTRAMUSCULAR; INTRAVENOUS at 10:45

## 2025-04-21 RX ADMIN — PROPOFOL 50 MG: 10 INJECTION, EMULSION INTRAVENOUS at 10:56

## 2025-04-21 RX ADMIN — PROPOFOL 200 MG: 10 INJECTION, EMULSION INTRAVENOUS at 10:36

## 2025-04-21 RX ADMIN — LIDOCAINE HYDROCHLORIDE 100 MG: 20 INJECTION, SOLUTION INFILTRATION; PERINEURAL at 10:36

## 2025-04-21 RX ADMIN — FENTANYL CITRATE 50 MCG: 50 INJECTION, SOLUTION INTRAMUSCULAR; INTRAVENOUS at 10:36

## 2025-04-21 RX ADMIN — PROPOFOL 50 MG: 10 INJECTION, EMULSION INTRAVENOUS at 10:43

## 2025-04-21 RX ADMIN — PROPOFOL 50 MG: 10 INJECTION, EMULSION INTRAVENOUS at 10:52

## 2025-04-21 RX ADMIN — PROPOFOL 50 MG: 10 INJECTION, EMULSION INTRAVENOUS at 10:47

## 2025-04-21 RX ADMIN — SODIUM CHLORIDE: 9 INJECTION, SOLUTION INTRAVENOUS at 10:34

## 2025-04-21 SDOH — HEALTH STABILITY: MENTAL HEALTH: CURRENT SMOKER: 1

## 2025-04-21 ASSESSMENT — PAIN - FUNCTIONAL ASSESSMENT
PAIN_FUNCTIONAL_ASSESSMENT: 0-10

## 2025-04-21 ASSESSMENT — PAIN SCALES - GENERAL
PAINLEVEL_OUTOF10: 0 - NO PAIN
PAIN_LEVEL: 0
PAINLEVEL_OUTOF10: 0 - NO PAIN
PAINLEVEL_OUTOF10: 0 - NO PAIN

## 2025-04-21 NOTE — ANESTHESIA POSTPROCEDURE EVALUATION
Patient: Sukhdeep Byrd    Procedure Summary       Date: 04/21/25 Room / Location: [facility]    Anesthesia Start: 1032 Anesthesia Stop:     Procedures:       COLONOSCOPY      EGD Diagnosis:       Diarrhea of infectious origin      Chronic RLQ pain      LUQ pain      Gastroesophageal reflux disease, unspecified whether esophagitis present      Diarrhea of infectious origin    Scheduled Providers: Nikolas Moya MD; FLORENTINO Moody Responsible Provider: FLORENTINO Moody    Anesthesia Type: MAC ASA Status: 2            Anesthesia Type: MAC    Vitals Value Taken Time   /74 04/21/25 11:03   Temp 36 04/21/25 11:03   Pulse 70 04/21/25 11:03   Resp 16 04/21/25 11:03   SpO2 97 04/21/25 11:03       Anesthesia Post Evaluation    Patient location during evaluation: bedside  Patient participation: complete - patient participated  Level of consciousness: awake  Pain score: 0  Pain management: adequate  Airway patency: patent  Cardiovascular status: acceptable  Respiratory status: acceptable and room air  Hydration status: acceptable  Postoperative Nausea and Vomiting: none        No notable events documented.

## 2025-04-21 NOTE — ANESTHESIA PREPROCEDURE EVALUATION
Patient: Sukhdeep Byrd    Procedure Information       Date/Time: 04/21/25 1100    Scheduled providers: Nikolas Moya MD; THALIA Moody-CRNA    Procedures:       COLONOSCOPY      EGD    Location: [facility]            Relevant Problems   Cardiac   (+) PVC's (premature ventricular contractions)      Neuro   (+) Lumbosacral radiculopathy      GI   (+) GERD (gastroesophageal reflux disease)   (+) IBS (irritable bowel syndrome)      Liver   (+) Hepatomegaly      Endocrine   (+) Obesity due to excess calories      HEENT   (+) Seasonal allergies      Tobacco   (+) Vapes nicotine containing substance       Clinical information reviewed:    Allergies                NPO Detail:  No data recorded     Physical Exam    Airway  Mallampati: II  TM distance: >3 FB  Neck ROM: full  Mouth opening: 3 or more finger widths     Cardiovascular   Rhythm: regular  Rate: normal     Dental - normal exam     Pulmonary Breath sounds clear to auscultation     Abdominal (+) obese       Other findings: Vapes nicotine daily  Smokes MJ none recent        Anesthesia Plan    History of general anesthesia?: yes  History of complications of general anesthesia?: no    ASA 2     MAC   (Preoxygenated 2L prior to procedure.Patient positioned self to comfort prior to sedation administered; eyes closed; continuous monitoring.)  The patient is a current smoker.  Patient was previously instructed to abstain from smoking on day of procedure.  Patient did not smoke on day of procedure.    intravenous induction   Anesthetic plan and risks discussed with patient.    Plan discussed with CRNA.

## 2025-04-21 NOTE — H&P
Outpatient NR Procedure H&P    Patient Profile  Name Sukhdeep Byrd  Date of Birth 1990  MRN [id number]  PCP Naseem Aguila        Diagnosis: gerd,diarrhea  Procedure(s):  EGD/Colonoscopy         Allergies  RX Allergies[1]    Past Medical History   Medical History[2]    Medication Reviewed - yes  Prior to Admission medications    Medication Sig Start Date End Date Taking? Authorizing Provider   wheat dextrin (Benefiber Healthy Shape) 5 gram/7.4 gram powder Take 1 teaspoon daily 4/9/25  Yes THALIA Dumont-CNP   buPROPion XL (Wellbutrin XL) 300 mg 24 hr tablet Take 1 tablet (300 mg) by mouth once daily in the morning. Do not crush, chew, or split.  Patient not taking: Reported on 3/19/2025 9/18/24   Naseem Aguila MD   phentermine (Adipex-P) 37.5 mg tablet Take 1 tablet (37.5 mg) by mouth once daily in the morning. Take before meals.  Patient not taking: Reported on 4/9/2025 3/21/25   Naseem Aguila MD   semaglutide, weight loss, (Wegovy) 0.25 mg/0.5 mL pen injector Inject 0.25 mg under the skin 1 (one) time per week for 12 doses. 3/19/25 6/5/25  Naseem Aguila MD       Physical Exam  Vitals:    04/21/25 1019   BP: 141/89   Pulse: 74   Resp: 24   Temp: 36.7 °C (98.1 °F)   SpO2: 100%      Weight   Vitals:    04/21/25 1019   Weight: 102 kg (225 lb)     BMI Body mass index is 31.38 kg/m².    General: A&Ox3, NAD.  HEENT: AT/NC.   CV: RRR. No murmur.  Resp: CTA bilaterally. No wheezing, rhonchi or rales.   GI: Soft, NT/ND. BSx4.  Extrem: No edema. Pulses intact.  Skin: No Jaundice.   Neuro: No focal deficits.   Psych: Normal mood and affect.        Oropharyngeal Classification I (soft palate, uvula, fauces, and tonsillar pillars visible)  ASA PS Classification 2  Sedation Plan: Deep Sedation.  Procedure Plan - pre-procedural (re)assesment completed by physician:  discharge/transfer patient when discharge criteria met    Nikolas Moya MD  4/21/2025 10:21 AM          [1]   Allergies  Allergen Reactions    Iodides  Unknown    Shellfish Derived Rash and Nausea/vomiting    Vancomycin Rash   [2]   Past Medical History:  Diagnosis Date    Acute upper respiratory infection, unspecified 06/30/2020    Acute URI    Body mass index (BMI) 34.0-34.9, adult 01/17/2022    BMI 34.0-34.9,adult    Cough, unspecified 02/11/2020    Cough in adult    Cough, unspecified 04/07/2020    Cough in adult    Encounter for follow-up examination after completed treatment for conditions other than malignant neoplasm 08/12/2016    Postoperative follow-up    Impacted cerumen, unspecified ear 02/11/2020    Wax in ear    Nausea 08/24/2020    Moderate nausea    Other acute postprocedural pain 08/12/2016    Postoperative pain    Other conditions influencing health status 04/07/2020    Testicular/scrotal pain    Other obesity due to excess calories 01/17/2022    Class 1 obesity due to excess calories without serious comorbidity with body mass index (BMI) of 34.0 to 34.9 in adult    Other specified diseases of gallbladder 12/16/2015    Biliary dyskinesia    Pain in right leg 06/15/2020    Pain of right lower extremity    Pain in right wrist 03/10/2020    Right wrist pain    Personal history of other diseases of the digestive system 12/16/2015    History of hiatal hernia    Personal history of other diseases of the digestive system 12/16/2015    History of chronic cholecystitis    Personal history of other diseases of the musculoskeletal system and connective tissue 07/31/2020    History of tendinitis    Personal history of other diseases of the respiratory system 02/11/2020    History of upper respiratory infection    Personal history of other diseases of the respiratory system 02/11/2020    History of bronchitis    Personal history of other specified conditions 08/23/2019    History of diarrhea    Personal history of other specified conditions 08/23/2019    History of epigastric pain    Vomiting, unspecified 03/10/2020    Vomiting alone

## 2025-04-21 NOTE — DISCHARGE INSTRUCTIONS

## 2025-05-02 LAB
LABORATORY COMMENT REPORT: NORMAL
PATH REPORT.FINAL DX SPEC: NORMAL
PATH REPORT.GROSS SPEC: NORMAL
PATH REPORT.TOTAL CANCER: NORMAL

## 2025-05-17 ENCOUNTER — APPOINTMENT (OUTPATIENT)
Dept: RADIOLOGY | Facility: HOSPITAL | Age: 35
End: 2025-05-17
Payer: COMMERCIAL

## 2025-05-17 ENCOUNTER — HOSPITAL ENCOUNTER (EMERGENCY)
Facility: HOSPITAL | Age: 35
Discharge: HOSPICE | End: 2025-05-17
Attending: EMERGENCY MEDICINE
Payer: COMMERCIAL

## 2025-05-17 ENCOUNTER — HOSPITAL ENCOUNTER (EMERGENCY)
Facility: HOSPITAL | Age: 35
Discharge: HOME | End: 2025-05-18
Attending: EMERGENCY MEDICINE
Payer: COMMERCIAL

## 2025-05-17 VITALS
RESPIRATION RATE: 18 BRPM | OXYGEN SATURATION: 98 % | HEART RATE: 80 BPM | DIASTOLIC BLOOD PRESSURE: 92 MMHG | WEIGHT: 225 LBS | BODY MASS INDEX: 31.5 KG/M2 | TEMPERATURE: 98 F | HEIGHT: 71 IN | SYSTOLIC BLOOD PRESSURE: 142 MMHG

## 2025-05-17 VITALS
SYSTOLIC BLOOD PRESSURE: 141 MMHG | DIASTOLIC BLOOD PRESSURE: 96 MMHG | TEMPERATURE: 99.5 F | WEIGHT: 225 LBS | OXYGEN SATURATION: 99 % | RESPIRATION RATE: 16 BRPM | HEIGHT: 71 IN | BODY MASS INDEX: 31.5 KG/M2 | HEART RATE: 73 BPM

## 2025-05-17 DIAGNOSIS — H54.62 VISION LOSS OF LEFT EYE: ICD-10-CM

## 2025-05-17 DIAGNOSIS — R51.9 ACUTE NONINTRACTABLE HEADACHE, UNSPECIFIED HEADACHE TYPE: Primary | ICD-10-CM

## 2025-05-17 DIAGNOSIS — G43.109 OCULAR MIGRAINE: Primary | ICD-10-CM

## 2025-05-17 LAB
ALBUMIN SERPL BCP-MCNC: 4.9 G/DL (ref 3.4–5)
ALP SERPL-CCNC: 48 U/L (ref 33–120)
ALT SERPL W P-5'-P-CCNC: 17 U/L (ref 10–52)
ANION GAP SERPL CALC-SCNC: 11 MMOL/L (ref 10–20)
AST SERPL W P-5'-P-CCNC: 13 U/L (ref 9–39)
BASOPHILS # BLD AUTO: 0.03 X10*3/UL (ref 0–0.1)
BASOPHILS NFR BLD AUTO: 0.4 %
BILIRUB SERPL-MCNC: 1 MG/DL (ref 0–1.2)
BUN SERPL-MCNC: 19 MG/DL (ref 6–23)
CALCIUM SERPL-MCNC: 9.7 MG/DL (ref 8.6–10.3)
CHLORIDE SERPL-SCNC: 104 MMOL/L (ref 98–107)
CO2 SERPL-SCNC: 30 MMOL/L (ref 21–32)
CREAT SERPL-MCNC: 0.86 MG/DL (ref 0.5–1.3)
EGFRCR SERPLBLD CKD-EPI 2021: >90 ML/MIN/1.73M*2
EOSINOPHIL # BLD AUTO: 0.26 X10*3/UL (ref 0–0.7)
EOSINOPHIL NFR BLD AUTO: 3.8 %
ERYTHROCYTE [DISTWIDTH] IN BLOOD BY AUTOMATED COUNT: 12.1 % (ref 11.5–14.5)
ERYTHROCYTE [SEDIMENTATION RATE] IN BLOOD BY WESTERGREN METHOD: <1 MM/H (ref 0–15)
GLUCOSE SERPL-MCNC: 89 MG/DL (ref 74–99)
HCT VFR BLD AUTO: 42.2 % (ref 41–52)
HGB BLD-MCNC: 14.3 G/DL (ref 13.5–17.5)
IMM GRANULOCYTES # BLD AUTO: 0.02 X10*3/UL (ref 0–0.7)
IMM GRANULOCYTES NFR BLD AUTO: 0.3 % (ref 0–0.9)
LYMPHOCYTES # BLD AUTO: 2.35 X10*3/UL (ref 1.2–4.8)
LYMPHOCYTES NFR BLD AUTO: 34.8 %
MCH RBC QN AUTO: 30.4 PG (ref 26–34)
MCHC RBC AUTO-ENTMCNC: 33.9 G/DL (ref 32–36)
MCV RBC AUTO: 90 FL (ref 80–100)
MONOCYTES # BLD AUTO: 0.57 X10*3/UL (ref 0.1–1)
MONOCYTES NFR BLD AUTO: 8.4 %
NEUTROPHILS # BLD AUTO: 3.53 X10*3/UL (ref 1.2–7.7)
NEUTROPHILS NFR BLD AUTO: 52.3 %
NRBC BLD-RTO: 0 /100 WBCS (ref 0–0)
PLATELET # BLD AUTO: 176 X10*3/UL (ref 150–450)
POTASSIUM SERPL-SCNC: 4.1 MMOL/L (ref 3.5–5.3)
PROT SERPL-MCNC: 7.3 G/DL (ref 6.4–8.2)
RBC # BLD AUTO: 4.71 X10*6/UL (ref 4.5–5.9)
SODIUM SERPL-SCNC: 141 MMOL/L (ref 136–145)
WBC # BLD AUTO: 6.8 X10*3/UL (ref 4.4–11.3)

## 2025-05-17 PROCEDURE — 99284 EMERGENCY DEPT VISIT MOD MDM: CPT | Mod: 25 | Performed by: EMERGENCY MEDICINE

## 2025-05-17 PROCEDURE — 99285 EMERGENCY DEPT VISIT HI MDM: CPT | Performed by: EMERGENCY MEDICINE

## 2025-05-17 PROCEDURE — 85652 RBC SED RATE AUTOMATED: CPT

## 2025-05-17 PROCEDURE — 70450 CT HEAD/BRAIN W/O DYE: CPT

## 2025-05-17 PROCEDURE — 36415 COLL VENOUS BLD VENIPUNCTURE: CPT

## 2025-05-17 PROCEDURE — 85025 COMPLETE CBC W/AUTO DIFF WBC: CPT

## 2025-05-17 PROCEDURE — 96361 HYDRATE IV INFUSION ADD-ON: CPT | Performed by: EMERGENCY MEDICINE

## 2025-05-17 PROCEDURE — 96375 TX/PRO/DX INJ NEW DRUG ADDON: CPT | Performed by: EMERGENCY MEDICINE

## 2025-05-17 PROCEDURE — 70450 CT HEAD/BRAIN W/O DYE: CPT | Performed by: RADIOLOGY

## 2025-05-17 PROCEDURE — 96374 THER/PROPH/DIAG INJ IV PUSH: CPT | Performed by: EMERGENCY MEDICINE

## 2025-05-17 PROCEDURE — 2500000001 HC RX 250 WO HCPCS SELF ADMINISTERED DRUGS (ALT 637 FOR MEDICARE OP)

## 2025-05-17 PROCEDURE — 99285 EMERGENCY DEPT VISIT HI MDM: CPT | Mod: 25 | Performed by: EMERGENCY MEDICINE

## 2025-05-17 PROCEDURE — 80053 COMPREHEN METABOLIC PANEL: CPT

## 2025-05-17 PROCEDURE — 2500000004 HC RX 250 GENERAL PHARMACY W/ HCPCS (ALT 636 FOR OP/ED): Mod: JZ

## 2025-05-17 RX ORDER — DIPHENHYDRAMINE HYDROCHLORIDE 50 MG/ML
25 INJECTION, SOLUTION INTRAMUSCULAR; INTRAVENOUS ONCE
Status: COMPLETED | OUTPATIENT
Start: 2025-05-17 | End: 2025-05-17

## 2025-05-17 RX ORDER — ACETAMINOPHEN 325 MG/1
975 TABLET ORAL ONCE
Status: COMPLETED | OUTPATIENT
Start: 2025-05-17 | End: 2025-05-17

## 2025-05-17 RX ORDER — ONDANSETRON HYDROCHLORIDE 2 MG/ML
4 INJECTION, SOLUTION INTRAVENOUS ONCE
Status: COMPLETED | OUTPATIENT
Start: 2025-05-17 | End: 2025-05-17

## 2025-05-17 RX ADMIN — SODIUM CHLORIDE, SODIUM LACTATE, POTASSIUM CHLORIDE, AND CALCIUM CHLORIDE 1000 ML: .6; .31; .03; .02 INJECTION, SOLUTION INTRAVENOUS at 17:42

## 2025-05-17 RX ADMIN — ONDANSETRON 4 MG: 2 INJECTION INTRAMUSCULAR; INTRAVENOUS at 17:48

## 2025-05-17 RX ADMIN — ACETAMINOPHEN 975 MG: 325 TABLET ORAL at 17:49

## 2025-05-17 RX ADMIN — DIPHENHYDRAMINE HYDROCHLORIDE 25 MG: 50 INJECTION, SOLUTION INTRAMUSCULAR; INTRAVENOUS at 17:48

## 2025-05-17 ASSESSMENT — COLUMBIA-SUICIDE SEVERITY RATING SCALE - C-SSRS
1. IN THE PAST MONTH, HAVE YOU WISHED YOU WERE DEAD OR WISHED YOU COULD GO TO SLEEP AND NOT WAKE UP?: NO
2. HAVE YOU ACTUALLY HAD ANY THOUGHTS OF KILLING YOURSELF?: NO
6. HAVE YOU EVER DONE ANYTHING, STARTED TO DO ANYTHING, OR PREPARED TO DO ANYTHING TO END YOUR LIFE?: NO

## 2025-05-17 ASSESSMENT — LIFESTYLE VARIABLES
EVER FELT BAD OR GUILTY ABOUT YOUR DRINKING: NO
EVER HAD A DRINK FIRST THING IN THE MORNING TO STEADY YOUR NERVES TO GET RID OF A HANGOVER: NO
HAVE PEOPLE ANNOYED YOU BY CRITICIZING YOUR DRINKING: NO
HAVE YOU EVER FELT YOU SHOULD CUT DOWN ON YOUR DRINKING: NO
TOTAL SCORE: 0

## 2025-05-17 ASSESSMENT — VISUAL ACUITY
OU: 20/13
OS: 20/20
OD: 20/15

## 2025-05-17 NOTE — PROGRESS NOTES
Expected Patient  Facility: [facility]  Service: ophtho    HPI: 34-year-old male with left retro-orbital headache and peripheral vision loss concerning for optic neuritis according to sending clinician.  Patient was seen and evaluated by an outpatient optometrist who had a clinical concern for this.  No trauma stable coming by private car    [facility]

## 2025-05-17 NOTE — ED PROVIDER NOTES
Emergency Department Provider Note             History of Present Illness   CC: Headache (Pt endorses left sided HA over the 2 days prior to arrival. Pt reports it started with loss of peripheral vision and Nausea. ) and Eye Problem    History provided by: Patient  Limitations to History: None    HPI:  Sukhdeep Byrd is a 34 y.o. male who is otherwise healthy who presents for 2 days of left-sided ocular headache.  He states that it initially presented with loss of his peripheral vision, felt as though he was looking through a pinhole.  States he also had subjective feelings of seeing lights in his left eye.  He states he also had sensation of strange smell in his nose.  Both of these symptoms have now resolved and then more followed by a headache which primary localizes behind his eye.  He states he saw his eye doctor the day prior and the day of and his vision is grossly intact.  However, patient's optometrist called the emergency room prior to patient presentation and stated that his peripheral vision has significant deficits despite his visual acuity still being intact in the left eye.  The optometrist spoke with their on-call ophthalmologist who stated there was concern for possible optic neuritis and this is why the patient was sent to the emergency room.  He states he had tonometry that was normal.  He denies any family history of glaucoma or any personal history of glaucoma.  He otherwise denies any new neurologic symptoms including numbness, weakness, tingling.  He denies any new medication changes.  He does also endorse some mild nausea.  He states the headache is ongoing    Physical Exam   Triage vitals:  T 37.5 °C (99.5 °F)  HR 91  BP (!) 149/95  RR 16  O2 100 % None (Room air)    Physical Exam  Constitutional:       Appearance: Normal appearance.   HENT:      Head: Normocephalic and atraumatic.   Eyes:      Extraocular Movements: Extraocular movements intact.   Cardiovascular:      Rate and Rhythm:  Normal rate and regular rhythm.      Heart sounds: Normal heart sounds.   Pulmonary:      Effort: Pulmonary effort is normal. No respiratory distress.      Breath sounds: Normal breath sounds.   Musculoskeletal:         General: Normal range of motion.   Skin:     General: Skin is warm and dry.   Neurological:      General: No focal deficit present.      Mental Status: He is alert. Mental status is at baseline.      Cranial Nerves: No cranial nerve deficit, dysarthria or facial asymmetry.      Sensory: Sensation is intact. No sensory deficit.      Motor: Motor function is intact. No weakness.      Gait: Gait normal.   Psychiatric:         Mood and Affect: Mood normal.         Speech: Speech normal.         ED Course & Medical Decision Making   Medical Decision Makin y.o. male without significant medical history who presents for sudden onset vision loss which is now resolved, and new migraine.  Patient without significant history of migraines.  VSS on presentation, noted hypertension of 149/95.  Patient neurologically intact, examination as above no neurologic deficits.  Patient's optometrist contacted the emergency room prior to the patient's presentation and stated that the patient still had peripheral vision loss deficits despite his visual acuity being intact relative to his baseline.  The on-call ophthalmologist available in that office insisted the patient present to the emergency room due to concern for possible optic neuritis.  CT head was obtained as noted below, entirely normal.  Notably also, ESR is WNL, very unlikely the patient has temporal arteritis.  Contacted on-call neurologist, Dr. Davis who felt as though inpatient admission at [facility] would not be appropriate without dilated eye exam by an ophthalmologist.  Therefore, consult was placed to [facility] ophthalmology to have the patient transferred for further evaluation.  At the time of writing this note, consult requested still pending.  Patient  final disposition is pending.    I personally signed out care of this patient at approximately 7 PM on 5/17/2025 to Dr. Taran Mirza.     External Records Reviewed: None    Differential diagnoses considered include but are not limited to: Migraine with aura, focal seizure, optic neuritis, glaucoma    Social Determinants Limiting Care: None identified    EKG: EKG not obtained    Results: Relevant laboratory and radiographic results were reviewed and independently interpreted by myself.  As necessary, they are commented on in the ED Course.    Chronic Medical Conditions Significantly Affecting Care: As documented above in [facility]    Patient was discussed with the following consultants/services: Pending at the time of writing this note    Care Considerations: As documented above in [facility]    ED Course:  ED Course as of 05/18/25 1605   Sat May 17, 2025   1808 CT head without any significant abnormality. [ME]   1808 CBC, CMP, ESR grossly WNL. [ME]   1836 Spoke with on-call neurologist, Dr. Davis.  [place] as though patient workup for optic neuritis could not be completed here at [facility] without formal ophthalmology evaluation.  Recommended patient be transferred to [facility] for dilated retinal exam with ophthalmology. [ME]   1938 Spoke with Dr. Balbuena with ophthalmology at [facility].  Agrees to eval with the patient in the ER.  Spoke with Dr. Ramirez.  Patient has been excepted.  Patient be transferred via private vehicle.  His wife is driving. [AL]      ED Course User Index  [AL] Taran Mirza DO  [ME] Virgilio Resendiz DO         Diagnoses as of 05/18/25 1605   Acute nonintractable headache, unspecified headache type   Vision loss of left eye     Disposition   TBD    Procedures   None     Patient seen and discussed with ED attending physician.    Virgilio Resendiz D.O.   Family Medicine PGY-2, [facility]  05/18/25           Virgilio Resendiz DO  Resident  05/17/25 1858       Virgilio Resendiz DO  Resident  05/18/25 1605

## 2025-05-18 PROCEDURE — 96372 THER/PROPH/DIAG INJ SC/IM: CPT

## 2025-05-18 PROCEDURE — 2500000001 HC RX 250 WO HCPCS SELF ADMINISTERED DRUGS (ALT 637 FOR MEDICARE OP)

## 2025-05-18 PROCEDURE — 2500000004 HC RX 250 GENERAL PHARMACY W/ HCPCS (ALT 636 FOR OP/ED): Mod: JZ

## 2025-05-18 RX ORDER — METOCLOPRAMIDE HYDROCHLORIDE 5 MG/ML
10 INJECTION INTRAMUSCULAR; INTRAVENOUS ONCE
Status: COMPLETED | OUTPATIENT
Start: 2025-05-18 | End: 2025-05-18

## 2025-05-18 RX ORDER — ACETAMINOPHEN 325 MG/1
975 TABLET ORAL ONCE
Status: COMPLETED | OUTPATIENT
Start: 2025-05-18 | End: 2025-05-18

## 2025-05-18 RX ORDER — KETOROLAC TROMETHAMINE 15 MG/ML
15 INJECTION, SOLUTION INTRAMUSCULAR; INTRAVENOUS ONCE
Status: COMPLETED | OUTPATIENT
Start: 2025-05-18 | End: 2025-05-18

## 2025-05-18 RX ADMIN — METOCLOPRAMIDE 10 MG: 5 INJECTION, SOLUTION INTRAMUSCULAR; INTRAVENOUS at 01:10

## 2025-05-18 RX ADMIN — KETOROLAC TROMETHAMINE 15 MG: 15 INJECTION, SOLUTION INTRAMUSCULAR; INTRAVENOUS at 01:10

## 2025-05-18 RX ADMIN — ACETAMINOPHEN 975 MG: 325 TABLET ORAL at 01:10

## 2025-05-18 ASSESSMENT — PAIN DESCRIPTION - LOCATION: LOCATION: HEAD

## 2025-05-18 ASSESSMENT — PAIN - FUNCTIONAL ASSESSMENT: PAIN_FUNCTIONAL_ASSESSMENT: 0-10

## 2025-05-18 ASSESSMENT — LIFESTYLE VARIABLES
HAVE YOU EVER FELT YOU SHOULD CUT DOWN ON YOUR DRINKING: NO
EVER FELT BAD OR GUILTY ABOUT YOUR DRINKING: NO
HAVE PEOPLE ANNOYED YOU BY CRITICIZING YOUR DRINKING: NO
TOTAL SCORE: 0
EVER HAD A DRINK FIRST THING IN THE MORNING TO STEADY YOUR NERVES TO GET RID OF A HANGOVER: NO

## 2025-05-18 ASSESSMENT — PAIN SCALES - GENERAL: PAINLEVEL_OUTOF10: 6

## 2025-05-18 NOTE — DISCHARGE INSTRUCTIONS
Please continue with Tylenol, ibuprofen as needed for headache pain at home.  May also take Excedrin which is Tylenol and includes caffeine that some people respond well to.  Follow-up with your optometrist and return to the emergency department for new or worsening symptoms or any other concerns.

## 2025-05-18 NOTE — ED PROVIDER NOTES
Emergency Department Provider Note        History of Present Illness     CC: Eye Trauma     HPI:  This is a 34-year-old male who presents to the emergency department as a transfer from outside hospital for ophthalmology consultation with consideration for optic neuritis.  Patient states that on Thursday his symptoms began when he developed right ED loss of vision in his left eye.  He also stated that he felt that he was seeing bright lights flashing in his eyes.  States that this lasted for 15 to 20 minutes before subsiding.  But then shortly thereafter he began developing symptoms of headache near his left eye that seem to be persistent.  This has since lessened however he still is symptomatic somewhat when looking towards his left.  He visited his optometrist today who had concerns with optic nerve swelling and possible optic neuritis and therefore recommended that he be evaluated in the emergency department.  He denies any other HEENT symptoms including nasal congestion, runny nose, ear pain, difficulty swallowing.  Denies systemic symptoms including nausea or vomiting.  No other symptoms at this time.    Limitations to history: None  Independent historian(s): None   Records Reviewed: Recent available ED and inpatient notes reviewed in EMR.    PMHx/PSHx:  Per HPI.   - has a past medical history of Acute upper respiratory infection, unspecified (06/30/2020), Body mass index (BMI) 34.0-34.9, adult (01/17/2022), Cough, unspecified (02/11/2020), Cough, unspecified (04/07/2020), Encounter for follow-up examination after completed treatment for conditions other than malignant neoplasm (08/12/2016), Impacted cerumen, unspecified ear (02/11/2020), Nausea (08/24/2020), Other acute postprocedural pain (08/12/2016), Other conditions influencing health status (04/07/2020), Other obesity due to excess calories (01/17/2022), Other specified diseases of gallbladder (12/16/2015), Pain in right leg (06/15/2020), Pain in right  wrist (03/10/2020), Personal history of other diseases of the digestive system (12/16/2015), Personal history of other diseases of the digestive system (12/16/2015), Personal history of other diseases of the musculoskeletal system and connective tissue (07/31/2020), Personal history of other diseases of the respiratory system (02/11/2020), Personal history of other diseases of the respiratory system (02/11/2020), Personal history of other specified conditions (08/23/2019), Personal history of other specified conditions (08/23/2019), and Vomiting, unspecified (03/10/2020).  - has a past surgical history that includes Cholecystectomy (11/30/2015) and Abdominal adhesion surgery (08/17/2016).    Medications:  Reviewed in EMR. See EMR for complete list of medications and doses.    Allergies:  Iodides, Shellfish derived, and Vancomycin    Social History:  - Tobacco:  reports that he has never smoked. He has never used smokeless tobacco.   - Alcohol:  reports current alcohol use.   - Illicit Drugs:  reports current drug use. Drug: Marijuana.     ROS:  Per HPI.       Physical Exam     Triage Vitals:  T 36.7 °C (98 °F)  HR 80  BP (!) 142/92  RR 18  O2 98 %      General: Patient resting comfortably, no acute distress, overall well appearing, and appropriately conversational.   Head: Normocephalic. Atraumatic.  Neck: FROM. No gross masses.   Eyes: EOMI. Conjunctiva clear.   ENT: Moist mucous membranes, no apparent trauma or lesions.    EXT: No peripheral edema, contusions, or wounds.   Skin: Warm and dry, no rashes or lesions.  Neuro: Alert. No focal neurological deficits. Motor and sensation intact throughout. Speech fluent. Normal gait.   Psych: Appropriate mood and behavior, converses and responds appropriately.      Medical Decision Making & ED Course     Labs:   Labs Reviewed - No data to display     Imaging:   Point of Care Ultrasound    (Results Pending)        EKG:  None    MDM:  This is a 34-year-old male who  presents to the emergency department as a transfer from outside hospital for ophthalmology consultation for evaluation of optic neuritis.  He is hemodynamically stable.  Mildly hypertensive other vitals are normal.  Point-of-care ultrasound obtained at the bedside shows optic nerve diameter 0.6 cm.  Ophthalmology consulted for further evaluation.  They do not appreciate any significant edema associated with the optic nerve.  They feel that his symptoms are likely secondary to an ocular migraine.  They do not recommend any further imaging at this time.  He was treated with medications for migraine including Tylenol, Toradol and Reglan.  At this time given clearance by ophthalmology is appropriate for discharge home.  I did consider obtaining MRI to further evaluate however patient does express the desire to be discharged home at this time anyway.  I recommended that he follow-up with his primary doctor and consider neurology if he continues to have the symptoms.  Recommended continue use of over-the-counter medications to treat his symptoms in the meantime.  Return precautions discussed.  Patient and his wife at bedside expressed understanding and agreed with the plan.  Remained stable and was discharged home.      ED Course:  Diagnoses as of 05/19/25 1424   Ocular migraine       Independent Result Review and Interpretation: Relevant laboratory and radiographic results were reviewed and independently interpreted by myself.  As necessary, they are commented on in the ED Course.    Social Determinants Limiting Care:  None identified      Patient seen by and discussed with the attending emergency medicine physician.       Disposition    Discharge    Kenn Anders DO   Emergency Medicine PGY-3  [facility]      Procedures        Performed by: Kenn Anders DO  Authorized by: Kenn Anders DO                Ocular Indications: visual change      Procedure: Ocular  Ultrasound    Findings:  Vitreous: The vitreous was identified and NO abnormalities were visualized.  Retina: The retina was visualized and there was NO retinal detachment.        Impression:  Ocular: The ocular US exam was NEGATIVE for abnormalities as described.      Comments: Optic nerve measurement 0.6 cm   ? SmartLinks last updated 5/18/2025 12:03 AM          Kenn Anders DO  Resident  05/19/25 [id number]

## 2025-05-18 NOTE — CONSULTS
Reason for consult  Left-sided headache and visual phenomena     History Of Present Illness  Sukhdeep Byrd is a 34 y.o. male with PMHx of IBS and migraine (as a child); past ocular hx of myopia OU presenting with two days of left sided headache behind left eye, one episode of shimmering visual phenomena (patient thinks was only left eye) which lasted 15 minutes, photophobia, and mild worsening of retro-orbital headache on far left gaze.     Denies any transient monocular vision loss, diplopia, or change in visual acuity. No other new neurologic symptoms. No recent eye or head trauma.       ROS   Patient denies redness, discharge, blind spots, flashes, curtain coming over vision, or new floaters.     Ophthalmic drops/medications   None     Past Medical History  He has a past medical history of Acute upper respiratory infection, unspecified (06/30/2020), Body mass index (BMI) 34.0-34.9, adult (01/17/2022), Cough, unspecified (02/11/2020), Cough, unspecified (04/07/2020), Encounter for follow-up examination after completed treatment for conditions other than malignant neoplasm (08/12/2016), Impacted cerumen, unspecified ear (02/11/2020), Nausea (08/24/2020), Other acute postprocedural pain (08/12/2016), Other conditions influencing health status (04/07/2020), Other obesity due to excess calories (01/17/2022), Other specified diseases of gallbladder (12/16/2015), Pain in right leg (06/15/2020), Pain in right wrist (03/10/2020), Personal history of other diseases of the digestive system (12/16/2015), Personal history of other diseases of the digestive system (12/16/2015), Personal history of other diseases of the musculoskeletal system and connective tissue (07/31/2020), Personal history of other diseases of the respiratory system (02/11/2020), Personal history of other diseases of the respiratory system (02/11/2020), Personal history of other specified conditions (08/23/2019), Personal history of other specified  "conditions (08/23/2019), and Vomiting, unspecified (03/10/2020).    Allergies  RX Allergies[1]     Last Recorded Vitals  Blood pressure (!) 142/92, pulse 80, temperature 36.7 °C (98 °F), temperature source Temporal, resp. rate 18, height 1.803 m (5' 11\"), weight 102 kg (225 lb), SpO2 98%.    Examination   Base Eye Exam       Visual Acuity (Snellen - Linear)         Right Left    Near sc 20/20 20/20              Tonometry (Tonopen, 12:39 AM)         Right Left    Pressure 10 12              Pupils         Pupils Dark Light Shape React APD    Right PERRL, No APD 4 2 Round Brisk None    Left PERRL, No APD 4 2 Round Brisk None              Visual Fields (Counting fingers)         Left Right     Full Full              Extraocular Movement         Right Left     Full Full   Endorses very mild worsening retro-orbital pain on far left gaze left eye              Neuro/Psych       Oriented x3: Yes              Dilation       Both eyes: 1% Mydriacyl & 2.5% Giles  @ 12:39 AM                  Additional Tests       Color         Right Left    Ishihara 11/11 11/11                  Slit Lamp and Fundus Exam       External Exam         Right Left    External Normal Normal              Slit Lamp Exam         Right Left    Lids/Lashes Normal Normal    Conjunctiva/Sclera White and quiet White and quiet    Cornea Clear Clear    Anterior Chamber Deep and quiet Deep and quiet    Iris Round and reactive Round and reactive    Lens Clear Clear    Anterior Vitreous Normal Normal              Fundus Exam         Right Left    Disc Normal Normal    C/D Ratio 0.2 0.2    Macula Normal Normal    Vessels Normal Normal    Periphery Normal Normal                    Relevant Results  CT of the brain without contrast dated 5/17/2025   IMPRESSION:  No acute intracranial hemorrhage or large territory infarction is evident.    Assessment & Plan  # Left sided headache with one episode of transient visual phenomena   - Presenting with two days of left sided " headache behind left eye, one episode of shimmering visual phenomena (patient thinks was only left eye) which lasted 15 minutes, photophobia, and mild worsening of retro-orbital headache on far left gaze.   - Presentation most consistent with migraine with visual aura given the characteristics and duration of the episode of transient visual phenomena, photophobia, and pt reported hx of migraines as a child. Migraine aura is typically binocular however often difficult for patients to tell if binocular or monocular during the episode.   - Less likely TIA given absence of risk factors or other new neurologic symptoms. Occipital seizures also less likely as these would only last a few seconds and the pt has no seizure hx.   - Optic neuritis less likely as there is only mild worsening of retro-orbital headache only in far left gaze and no vision loss or deficit in color vision. Exam without disc edema (although retrobulbar optic neuritis is possible) or RAPD. Pt also does not have any personal or known family hx of MS or optic neuritis.     - Pt states that he would like to follow up with his non- optometrist. Recommend follow up within 1-2 wks. Pt may call [phone] (EYES) to make appointment with  if preferred.       Discussed with Dr. Jackson (neuro-ophthalmologist)     Brandon Álvarez MD   Ophthalmology PGY-2    -----------------------------  Ophthalmology Adult Pager - [phone]  Ophthalmology Pediatrics Pager - [phone] (weekdays 8 am - 5 pm)     For adult follow-up appointments, call: [phone]  For pediatric follow-up appointments, call: [phone]    Note not finalized until attending signature.    Brief Key of Common Ophthalmology Abbreviations:  OD: right eye  OS: left eye  OU: both eyes  VA: visual acuity   IOP: intraocular pressure  EOMs: extraocular movements  CVF: confrontational visual fields  DFE: dilated fundus exam  DBH: dot blot hemorrhage  CWS: cotton wool spot  AC: anterior chamber  RD: retinal  detachment  PVD: posterior vitreous detachment  CEIOL: cataract extraction with intraocular lens insertion  POAG: primary open-angle glaucoma         [1]   Allergies  Allergen Reactions    Iodides Unknown    Shellfish Derived Rash and Nausea/vomiting    Vancomycin Rash

## 2025-05-18 NOTE — ED TRIAGE NOTES
Sukhdeep Byrd is a 34 y.o. male who is otherwise healthy who presents for 2 days of left-sided ocular headache.  Pt is a transfer from [place]

## 2025-06-13 ENCOUNTER — APPOINTMENT (OUTPATIENT)
Dept: UROLOGY | Facility: CLINIC | Age: 35
End: 2025-06-13
Payer: COMMERCIAL

## 2025-06-13 VITALS — HEART RATE: 78 BPM | DIASTOLIC BLOOD PRESSURE: 85 MMHG | SYSTOLIC BLOOD PRESSURE: 143 MMHG | TEMPERATURE: 97.1 F

## 2025-06-13 DIAGNOSIS — Z30.09 VASECTOMY EVALUATION: ICD-10-CM

## 2025-06-13 DIAGNOSIS — Z30.2 ENCOUNTER FOR VASECTOMY: ICD-10-CM

## 2025-06-13 LAB
POC APPEARANCE, URINE: CLEAR
POC BILIRUBIN, URINE: NEGATIVE
POC BLOOD, URINE: ABNORMAL
POC COLOR, URINE: YELLOW
POC GLUCOSE, URINE: NEGATIVE MG/DL
POC KETONES, URINE: NEGATIVE MG/DL
POC LEUKOCYTES, URINE: NEGATIVE
POC NITRITE,URINE: NEGATIVE
POC PH, URINE: 6 PH
POC PROTEIN, URINE: NEGATIVE MG/DL
POC SPECIFIC GRAVITY, URINE: 1.02
POC UROBILINOGEN, URINE: 0.2 EU/DL

## 2025-06-13 PROCEDURE — 55250 REMOVAL OF SPERM DUCT(S): CPT | Performed by: UROLOGY

## 2025-06-13 PROCEDURE — 81003 URINALYSIS AUTO W/O SCOPE: CPT | Performed by: UROLOGY

## 2025-06-13 NOTE — PROGRESS NOTES
PRIOR NOTES  34-year-old male here for vasectomy no recent changes in his health    UPDATED SUBJECTIVE HISTORY  06/13/25 -okay to proceed with vasectomy    Past Medical History  He has a past medical history of Acute upper respiratory infection, unspecified (06/30/2020), Body mass index (BMI) 34.0-34.9, adult (01/17/2022), Cough, unspecified (02/11/2020), Cough, unspecified (04/07/2020), Encounter for follow-up examination after completed treatment for conditions other than malignant neoplasm (08/12/2016), Impacted cerumen, unspecified ear (02/11/2020), Nausea (08/24/2020), Other acute postprocedural pain (08/12/2016), Other conditions influencing health status (04/07/2020), Other obesity due to excess calories (01/17/2022), Other specified diseases of gallbladder (12/16/2015), Pain in right leg (06/15/2020), Pain in right wrist (03/10/2020), Personal history of other diseases of the digestive system (12/16/2015), Personal history of other diseases of the digestive system (12/16/2015), Personal history of other diseases of the musculoskeletal system and connective tissue (07/31/2020), Personal history of other diseases of the respiratory system (02/11/2020), Personal history of other diseases of the respiratory system (02/11/2020), Personal history of other specified conditions (08/23/2019), Personal history of other specified conditions (08/23/2019), and Vomiting, unspecified (03/10/2020).    Surgical History  He has a past surgical history that includes Cholecystectomy (11/30/2015) and Abdominal adhesion surgery (08/17/2016).     Social History  He reports that he has never smoked. He has never used smokeless tobacco. He reports current alcohol use. He reports current drug use. Drug: Marijuana.    Family History  Family History[1]     Allergies  Iodides, Shellfish derived, and Vancomycin    ROS: 12 system review was completed and is negative with the exception of those signs and symptoms noted in the history of  present illness: A 12 system review was completed and is negative with the exception of those signs and symptoms noted in the history of present illness.     Exam:  General: in NAD, appears stated age  Head: normocephalic, atraumatic  Respiratory: normal effort, no use of accessory muscles  Cardiovascular: no edema noted  Skin: normal turgor, no rashes  Neurologic: grossly intact, oriented to person/place/time  Psychiatric: mode and affect appropriate    Patient ID: Sukhdeep Byrd is a 34 y.o. male.    Vasectomy    Date/Time: 6/13/2025 3:12 PM    Performed by: Neeraj Gauthier MD  Authorized by: Neeraj Gauthier MD    Additional Details:      Informed consent was obtained    Patient was placed in the supine position. His genitalia were prepped and draped in the usual sterile fashion. A 5 mL of 1% lidocaine were used to anesthetize a small area in the right hemiscrotum. A small stab incision was made and the vas deferens was identified. The vas deferens was then delivered through the incision. A 1-2 cm segment was isolated and removed. The ends were tied with 4-0 silk suture and the lumen cauterized. There was good hemostasis. The 2 ends were placed back in the right hemiscrotum and the skin was closed with a single interrupted 3-0 chromic stitch. The exact same procedure was repeated on the left side. The patient tolerated the procedure well.           Last Recorded Vitals  Blood pressure 143/85, pulse 78, temperature 36.2 °C (97.1 °F).    Lab Results   Component Value Date    CREATININE 0.86 05/17/2025    HGB 14.3 05/17/2025         ASSESSMENT/PLAN:  34-year-old male s/p uncomplicated vasectomy  - semen analysis 2 mo  - advised to take it easy over 2-3 days post-procedurally  - no intercourse or heavy lifting x 1 week  - ice, tylenol, ibuprofen prn  - 10-20 ejaculations between now and semen test  - contraception until he hears from us that he is sterile    Neeraj Gauthier MD         [1]   Family History  Problem  Relation Name Age of Onset    Leukemia Mother      Rheum arthritis Sister

## 2025-06-18 ENCOUNTER — APPOINTMENT (OUTPATIENT)
Dept: PRIMARY CARE | Facility: CLINIC | Age: 35
End: 2025-06-18
Payer: COMMERCIAL

## 2025-07-25 ENCOUNTER — APPOINTMENT (OUTPATIENT)
Dept: PRIMARY CARE | Facility: CLINIC | Age: 35
End: 2025-07-25
Payer: COMMERCIAL

## 2025-08-06 ENCOUNTER — TELEPHONE (OUTPATIENT)
Dept: PRIMARY CARE | Facility: CLINIC | Age: 35
End: 2025-08-06

## 2025-08-06 ENCOUNTER — OFFICE VISIT (OUTPATIENT)
Dept: PRIMARY CARE | Facility: CLINIC | Age: 35
End: 2025-08-06
Payer: COMMERCIAL

## 2025-08-06 VITALS
HEART RATE: 72 BPM | WEIGHT: 232.4 LBS | OXYGEN SATURATION: 97 % | DIASTOLIC BLOOD PRESSURE: 82 MMHG | HEIGHT: 71 IN | SYSTOLIC BLOOD PRESSURE: 116 MMHG | TEMPERATURE: 97.5 F | BODY MASS INDEX: 32.53 KG/M2

## 2025-08-06 DIAGNOSIS — M54.17 LUMBOSACRAL RADICULOPATHY: ICD-10-CM

## 2025-08-06 DIAGNOSIS — M54.41 ACUTE RIGHT-SIDED LOW BACK PAIN WITH RIGHT-SIDED SCIATICA: ICD-10-CM

## 2025-08-06 DIAGNOSIS — E66.9 SIMPLE OBESITY: ICD-10-CM

## 2025-08-06 DIAGNOSIS — E66.09 CLASS 1 OBESITY DUE TO EXCESS CALORIES WITHOUT SERIOUS COMORBIDITY WITH BODY MASS INDEX (BMI) OF 32.0 TO 32.9 IN ADULT: Primary | ICD-10-CM

## 2025-08-06 DIAGNOSIS — F32.A MILD DEPRESSION: ICD-10-CM

## 2025-08-06 DIAGNOSIS — K58.9 IRRITABLE BOWEL SYNDROME, UNSPECIFIED TYPE: ICD-10-CM

## 2025-08-06 DIAGNOSIS — E66.811 CLASS 1 OBESITY DUE TO EXCESS CALORIES WITHOUT SERIOUS COMORBIDITY WITH BODY MASS INDEX (BMI) OF 32.0 TO 32.9 IN ADULT: Primary | ICD-10-CM

## 2025-08-06 DIAGNOSIS — E66.811 CLASS 1 OBESITY DUE TO EXCESS CALORIES WITHOUT SERIOUS COMORBIDITY WITH BODY MASS INDEX (BMI) OF 32.0 TO 32.9 IN ADULT: ICD-10-CM

## 2025-08-06 DIAGNOSIS — E66.09 CLASS 1 OBESITY DUE TO EXCESS CALORIES WITHOUT SERIOUS COMORBIDITY WITH BODY MASS INDEX (BMI) OF 32.0 TO 32.9 IN ADULT: ICD-10-CM

## 2025-08-06 DIAGNOSIS — E66.811 CLASS 1 OBESITY DUE TO EXCESS CALORIES WITHOUT SERIOUS COMORBIDITY WITH BODY MASS INDEX (BMI) OF 33.0 TO 33.9 IN ADULT: ICD-10-CM

## 2025-08-06 DIAGNOSIS — E66.09 CLASS 1 OBESITY DUE TO EXCESS CALORIES WITHOUT SERIOUS COMORBIDITY WITH BODY MASS INDEX (BMI) OF 33.0 TO 33.9 IN ADULT: ICD-10-CM

## 2025-08-06 DIAGNOSIS — R16.0 ENLARGED LIVER: ICD-10-CM

## 2025-08-06 PROBLEM — R11.0 NAUSEA: Status: ACTIVE | Noted: 2023-09-21

## 2025-08-06 PROBLEM — M54.50 LOW BACK PAIN: Status: ACTIVE | Noted: 2017-07-11

## 2025-08-06 PROBLEM — N50.9 DISORDER OF MALE GENITAL ORGANS: Status: ACTIVE | Noted: 2025-08-06

## 2025-08-06 PROBLEM — M25.519 SHOULDER PAIN: Status: ACTIVE | Noted: 2023-07-06

## 2025-08-06 PROBLEM — H00.022 HORDEOLUM INTERNUM OF RIGHT LOWER EYELID: Status: ACTIVE | Noted: 2023-09-21

## 2025-08-06 PROBLEM — U07.1 COVID-19: Status: ACTIVE | Noted: 2023-09-21

## 2025-08-06 PROBLEM — R60.0 LOCALIZED EDEMA: Status: ACTIVE | Noted: 2017-09-09

## 2025-08-06 PROBLEM — M77.9 TENDINITIS: Status: ACTIVE | Noted: 2025-08-06

## 2025-08-06 PROBLEM — K44.9 HIATAL HERNIA: Status: ACTIVE | Noted: 2023-07-06

## 2025-08-06 PROCEDURE — 99213 OFFICE O/P EST LOW 20 MIN: CPT | Performed by: FAMILY MEDICINE

## 2025-08-06 PROCEDURE — 3008F BODY MASS INDEX DOCD: CPT | Performed by: FAMILY MEDICINE

## 2025-08-06 RX ORDER — PHENTERMINE HYDROCHLORIDE 37.5 MG/1
37.5 TABLET ORAL
Qty: 30 TABLET | Refills: 2 | Status: SHIPPED | OUTPATIENT
Start: 2025-08-06

## 2025-08-06 RX ORDER — HYDROCODONE BITARTRATE AND ACETAMINOPHEN 5; 325 MG/1; MG/1
1 TABLET ORAL EVERY 6 HOURS PRN
Qty: 20 TABLET | Refills: 0 | Status: SHIPPED | OUTPATIENT
Start: 2025-08-06 | End: 2025-08-13

## 2025-08-06 RX ORDER — DICLOFENAC SODIUM 75 MG/1
75 TABLET, DELAYED RELEASE ORAL 2 TIMES DAILY PRN
Qty: 60 TABLET | Refills: 3 | Status: SHIPPED | OUTPATIENT
Start: 2025-08-06 | End: 2026-08-06

## 2025-08-06 RX ORDER — METHYLPREDNISOLONE 4 MG/1
TABLET ORAL
Qty: 21 TABLET | Refills: 0 | Status: SHIPPED | OUTPATIENT
Start: 2025-08-06

## 2025-08-06 ASSESSMENT — ENCOUNTER SYMPTOMS
DEPRESSION: 0
LOSS OF SENSATION IN FEET: 1
OCCASIONAL FEELINGS OF UNSTEADINESS: 1

## 2025-08-06 ASSESSMENT — PATIENT HEALTH QUESTIONNAIRE - PHQ9
2. FEELING DOWN, DEPRESSED OR HOPELESS: NOT AT ALL
SUM OF ALL RESPONSES TO PHQ9 QUESTIONS 1 AND 2: 0
1. LITTLE INTEREST OR PLEASURE IN DOING THINGS: NOT AT ALL

## 2025-08-06 NOTE — PROGRESS NOTES
"Subjective   Patient ID: Sukhdeep Byrd is a 34 y.o. male who presents for Back Pain and Weight Gain.  HPI  Patient presents in office for lower back pain. Ongoing x several months. Symptoms included pain radiates a little into the right thigh. Through out the day he  experiences numbness in his toes. Has tried stretches, ice and heat, ibuprofen. Denies relief.   Taking current medications which were reviewed.  Problem list discussed.    Patient presents in office for weight gain. Has tried adipex. He states because of the back pain he has not been able to exercise. Would like to discuss going back on adipex.     Overall doing well.  Eating okay.  Staying active.    Has no other new problem /question.     ROS  Constitutional- No activity change. No appetite change.  Eyes- Denies vision changes.  Respiratory- No shortness of breath.  Cardiovascular- No palpitations. No chest pain.  GI- No nausea or vomiting. No diarrhea or constipation. Denies abdominal pain.  Musculoskeletal- myalgias  Extremities- No edema.  Neurological- Denies headaches. Denies dizziness.  Skin- No rashes.  Psychiatric/Behavioral- Denies significant anxiety, or depressed mood.     Objective     /82 (BP Location: Right arm, Patient Position: Sitting, BP Cuff Size: Adult long)   Pulse 72   Temp 36.4 °C (97.5 °F) (Temporal)   Ht 1.803 m (5' 11\")   Wt 105 kg (232 lb 6.4 oz)   SpO2 97%   BMI 32.41 kg/m²     Allergies[1]    Constitutional-- Well-nourished.  No distress  Head- unremarkable.  Ears- TMs clear.  Eyes- PERRL.  Conjunctiva normal.  Nose- Normal.  No rhinorrhea noted.  Throat- Oropharynx is clear and moist.  Neck- Supple with no thyromegaly.  No significant cervical adenopathy noted.  Pulmonary/Chest- Breath sounds normal with normal effort.  No wheezing.  Heart- Regular rate and rhythm.  No murmur.  Abdomen- Soft and non-tender.  No masses noted.  Musculoskeletal- Normal ROM.  No significant joint swelling  Extremities- No " edema.   Neurological- Alert.  No noted deficits.  Skin- Warm.  No rashes.  Psychiatric/Behavioral- Mood and affect normal.  Behavior normal.     Assessment/Plan   1. Class 1 obesity due to excess calories without serious comorbidity with body mass index (BMI) of 32.0 to 32.9 in adult        2. Acute right-sided low back pain with right-sided sciatica  methylPREDNISolone (Medrol Dospak) 4 mg tablets    diclofenac (Voltaren) 75 mg EC tablet    HYDROcodone-acetaminophen (Norco) 5-325 mg tablet      3. Simple obesity        4. Class 1 obesity due to excess calories without serious comorbidity with body mass index (BMI) of 33.0 to 33.9 in adult  phentermine (Adipex-P) 37.5 mg tablet      5. BMI 34.0-34.9,adult  phentermine (Adipex-P) 37.5 mg tablet      6. Lumbosacral radiculopathy  methylPREDNISolone (Medrol Dospak) 4 mg tablets    diclofenac (Voltaren) 75 mg EC tablet    HYDROcodone-acetaminophen (Norco) 5-325 mg tablet             Long talk. Treatment options reviewed.    Reviewed most recent lab work with patient. Advised patient to remain up to date on routine maintenance and health screening.  Maintain appointments with specialists as scheduled.  Advised patient to remain up to date on immunizations.     Discussed back pain.  Take methylprednisolone as discussed. Patient understands to take the least amount of pain medication in order to control symptoms.     Discussed importance of natural sources of nutrition.  Advised patient to consume vegetables, salads, fruits, nuts, and proteins such as fish and chicken.  Discussed portion control.      Discussed the importance of routine stretching and exercise.     Continue and take your medications as prescribed.    Health Maintenance issues discussed.    Importance of healthy diet and regular exercise regimen discussed.    We will contact you with any test results ordered. If you do not hear from us, please contact.    Follow-up as instructed or sooner if any problems  or symptoms do not resolve as expected.    All medical record entries made by the Scribe were at my direction and personally dictated by me.    I have reviewed the chart and agree that the record accurately reflects my personal performance of the history, physical exam, discussion, and plan.      Scribe Attestation  By signing my name below, I, Katiana Chacon   attest that this documentation has been prepared under the direction and in the presence of Naseem Aguila MD.         [1]   Allergies  Allergen Reactions    Iodides Unknown    Shellfish Derived Rash and Nausea/vomiting    Vancomycin Rash

## 2025-08-06 NOTE — LETTER
August 6, 2025     Patient: Sukhdeep Byrd   YOB: 1990   Date of Visit: 8/6/2025       To Whom It May Concern:    Sukhdeep Byrd was seen in my office on 08/06/2025. Patient is unable to bend for more than 30 minutes, in an 8 hour day for 7 days.    If you have any questions or concerns, please don't hesitate to call.         Sincerely,         Naseem Aguila MD

## 2025-08-11 ENCOUNTER — APPOINTMENT (OUTPATIENT)
Dept: PRIMARY CARE | Facility: CLINIC | Age: 35
End: 2025-08-11
Payer: COMMERCIAL

## 2025-08-12 ENCOUNTER — TELEPHONE (OUTPATIENT)
Dept: PRIMARY CARE | Facility: CLINIC | Age: 35
End: 2025-08-12
Payer: COMMERCIAL

## 2025-08-12 DIAGNOSIS — M54.17 LUMBOSACRAL RADICULOPATHY: ICD-10-CM

## 2025-08-12 DIAGNOSIS — M54.41 ACUTE RIGHT-SIDED LOW BACK PAIN WITH RIGHT-SIDED SCIATICA: ICD-10-CM

## 2025-08-12 RX ORDER — HYDROCODONE BITARTRATE AND ACETAMINOPHEN 5; 325 MG/1; MG/1
1 TABLET ORAL EVERY 6 HOURS PRN
Qty: 20 TABLET | Refills: 0 | Status: SHIPPED | OUTPATIENT
Start: 2025-08-12 | End: 2025-08-15 | Stop reason: SDUPTHER

## 2025-08-13 ENCOUNTER — HOSPITAL ENCOUNTER (OUTPATIENT)
Dept: RADIOLOGY | Facility: CLINIC | Age: 35
Discharge: HOME | End: 2025-08-13
Payer: COMMERCIAL

## 2025-08-13 DIAGNOSIS — M54.41 ACUTE RIGHT-SIDED LOW BACK PAIN WITH RIGHT-SIDED SCIATICA: ICD-10-CM

## 2025-08-13 DIAGNOSIS — M54.17 LUMBOSACRAL RADICULOPATHY: ICD-10-CM

## 2025-08-13 PROCEDURE — 72110 X-RAY EXAM L-2 SPINE 4/>VWS: CPT

## 2025-08-13 PROCEDURE — 72110 X-RAY EXAM L-2 SPINE 4/>VWS: CPT | Performed by: RADIOLOGY

## 2025-08-15 ENCOUNTER — OFFICE VISIT (OUTPATIENT)
Dept: PRIMARY CARE | Facility: CLINIC | Age: 35
End: 2025-08-15
Payer: COMMERCIAL

## 2025-08-15 VITALS
HEIGHT: 71 IN | TEMPERATURE: 97.1 F | BODY MASS INDEX: 31.92 KG/M2 | OXYGEN SATURATION: 97 % | SYSTOLIC BLOOD PRESSURE: 106 MMHG | DIASTOLIC BLOOD PRESSURE: 72 MMHG | WEIGHT: 228 LBS | HEART RATE: 90 BPM

## 2025-08-15 DIAGNOSIS — M54.17 LUMBOSACRAL RADICULOPATHY: ICD-10-CM

## 2025-08-15 DIAGNOSIS — E66.1 CLASS 1 DRUG-INDUCED OBESITY WITHOUT SERIOUS COMORBIDITY WITH BODY MASS INDEX (BMI) OF 31.0 TO 31.9 IN ADULT: ICD-10-CM

## 2025-08-15 DIAGNOSIS — E66.811 CLASS 1 DRUG-INDUCED OBESITY WITHOUT SERIOUS COMORBIDITY WITH BODY MASS INDEX (BMI) OF 31.0 TO 31.9 IN ADULT: ICD-10-CM

## 2025-08-15 DIAGNOSIS — M54.41 ACUTE RIGHT-SIDED LOW BACK PAIN WITH RIGHT-SIDED SCIATICA: Primary | ICD-10-CM

## 2025-08-15 PROCEDURE — 1036F TOBACCO NON-USER: CPT | Performed by: FAMILY MEDICINE

## 2025-08-15 PROCEDURE — 3008F BODY MASS INDEX DOCD: CPT | Performed by: FAMILY MEDICINE

## 2025-08-15 PROCEDURE — 99213 OFFICE O/P EST LOW 20 MIN: CPT | Performed by: FAMILY MEDICINE

## 2025-08-15 RX ORDER — HYDROCODONE BITARTRATE AND ACETAMINOPHEN 5; 325 MG/1; MG/1
1 TABLET ORAL EVERY 6 HOURS PRN
Qty: 24 TABLET | Refills: 0 | Status: SHIPPED | OUTPATIENT
Start: 2025-08-15 | End: 2025-08-25

## 2025-08-15 ASSESSMENT — ENCOUNTER SYMPTOMS: DEPRESSION: 0
